# Patient Record
Sex: FEMALE | Race: WHITE | NOT HISPANIC OR LATINO | Employment: OTHER | URBAN - METROPOLITAN AREA
[De-identification: names, ages, dates, MRNs, and addresses within clinical notes are randomized per-mention and may not be internally consistent; named-entity substitution may affect disease eponyms.]

---

## 2019-05-09 RX ORDER — ALPRAZOLAM 0.5 MG/1
TABLET ORAL
Qty: 90 TABLET | Refills: 1 | Status: CANCELLED | OUTPATIENT
Start: 2019-05-09

## 2019-05-13 ENCOUNTER — TELEPHONE (OUTPATIENT)
Dept: INTERNAL MEDICINE | Facility: CLINIC | Age: 84
End: 2019-05-13

## 2019-05-13 RX ORDER — ALPRAZOLAM 0.5 MG/1
0.5 TABLET ORAL 3 TIMES DAILY PRN
Qty: 90 TABLET | Refills: 0 | Status: SHIPPED | OUTPATIENT
Start: 2019-05-13 | End: 2019-06-25 | Stop reason: SDUPTHER

## 2019-05-23 RX ORDER — CARVEDILOL 25 MG/1
25 TABLET ORAL 2 TIMES DAILY WITH MEALS
Qty: 180 TABLET | Refills: 0 | Status: SHIPPED | OUTPATIENT
Start: 2019-05-23 | End: 2019-08-18 | Stop reason: SDUPTHER

## 2019-06-25 RX ORDER — ALPRAZOLAM 0.5 MG/1
0.5 TABLET ORAL 3 TIMES DAILY PRN
Qty: 90 TABLET | Refills: 0 | Status: SHIPPED | OUTPATIENT
Start: 2019-06-25 | End: 2019-08-08 | Stop reason: SDUPTHER

## 2019-06-26 RX ORDER — POTASSIUM CHLORIDE 750 MG/1
10 TABLET, EXTENDED RELEASE ORAL 2 TIMES DAILY
Refills: 0 | COMMUNITY
Start: 2019-03-31 | End: 2019-06-26 | Stop reason: SDUPTHER

## 2019-06-26 RX ORDER — LISINOPRIL 5 MG/1
5 TABLET ORAL DAILY
Refills: 1 | COMMUNITY
Start: 2019-05-19 | End: 2019-11-06 | Stop reason: SDUPTHER

## 2019-06-26 RX ORDER — POTASSIUM CHLORIDE 750 MG/1
10 TABLET, EXTENDED RELEASE ORAL 2 TIMES DAILY
Qty: 180 TABLET | Refills: 0 | Status: SHIPPED | OUTPATIENT
Start: 2019-06-26 | End: 2019-09-25 | Stop reason: SDUPTHER

## 2019-08-07 RX ORDER — ALPRAZOLAM 0.5 MG/1
0.5 TABLET ORAL 3 TIMES DAILY PRN
Qty: 90 TABLET | Refills: 0 | OUTPATIENT
Start: 2019-08-07

## 2019-08-08 ENCOUNTER — TELEPHONE (OUTPATIENT)
Dept: INTERNAL MEDICINE | Facility: CLINIC | Age: 84
End: 2019-08-08

## 2019-08-08 RX ORDER — ALPRAZOLAM 0.5 MG/1
0.5 TABLET ORAL 3 TIMES DAILY PRN
Qty: 45 TABLET | Refills: 0 | Status: SHIPPED | OUTPATIENT
Start: 2019-08-08 | End: 2019-08-29 | Stop reason: SDUPTHER

## 2019-08-08 NOTE — TELEPHONE ENCOUNTER
Patient is calling for a refill on Xanax. Pharmacy advised it was denied because she needs an appointment. Is scheduled for 8/22/19. She has only one pill left. If she has to be seen first, she would like enough until appointment.    Thank you

## 2019-08-19 RX ORDER — CARVEDILOL 25 MG/1
TABLET ORAL
Qty: 180 TABLET | Refills: 0 | Status: SHIPPED | OUTPATIENT
Start: 2019-08-19 | End: 2019-11-25 | Stop reason: SDUPTHER

## 2019-08-20 RX ORDER — FUROSEMIDE 20 MG/1
TABLET ORAL
Qty: 90 TABLET | Refills: 0 | Status: ON HOLD | OUTPATIENT
Start: 2019-08-20 | End: 2019-09-17 | Stop reason: SDUPTHER

## 2019-08-22 ENCOUNTER — OFFICE VISIT (OUTPATIENT)
Dept: INTERNAL MEDICINE | Facility: CLINIC | Age: 84
End: 2019-08-22

## 2019-08-22 VITALS — BODY MASS INDEX: 20 KG/M2 | HEIGHT: 68 IN | WEIGHT: 132 LBS

## 2019-08-22 DIAGNOSIS — F40.00 AGORAPHOBIA: ICD-10-CM

## 2019-08-22 DIAGNOSIS — R09.02 HYPOXIA: ICD-10-CM

## 2019-08-22 DIAGNOSIS — I10 ESSENTIAL HYPERTENSION: Primary | ICD-10-CM

## 2019-08-22 DIAGNOSIS — R60.0 LOCALIZED EDEMA: ICD-10-CM

## 2019-08-22 DIAGNOSIS — I50.22 CHRONIC SYSTOLIC CONGESTIVE HEART FAILURE (HCC): ICD-10-CM

## 2019-08-22 PROCEDURE — 99214 OFFICE O/P EST MOD 30 MIN: CPT | Performed by: INTERNAL MEDICINE

## 2019-08-22 NOTE — PROGRESS NOTES
Assessment and Plan  Rissa was seen today for hypertension and anxiety.    Diagnoses and all orders for this visit:    Essential hypertension  -     CBC & Differential; Future  -     Comprehensive Metabolic Panel; Future    Agoraphobia  Comments:  stable- continue regular alprazolam use.    Chronic systolic congestive heart failure (CMS/HCC)  Comments:  no evidence of decompensation today.    Hypoxia  Comments:  will order portable O2 for her to be a little more free to move around.     Localized edema  Comments:  I think the edema in her feet is dependent- keep elevated when able.  Continue to monitor sodium in her diet.       F/U and Patient Instructions    Return in about 3 months (around 11/22/2019).  There are no Patient Instructions on file for this visit.    Subjective    Lucy Duane is a 85 y.o. female being seen in our office today for Hypertension and Anxiety     History of the Present Illness  HPI Here for her reg f/u- she is feeling more SOB over the last couple of months.  She has some edema in her feet.  She uses O2 at home since hospitalization in 2016.  Does not have portable oxygen tank.   Very little activity -   Does well with alprazolam over years- no changes made.       Patient History        Significant Past History  The following portions of the patient's history were reviewed and updated as appropriate:PMHroutine: Social history , Allergies, Current Medications, Active Problem List and Health Maintenance              Social History  She  reports that she quit smoking about 11 years ago. Her smoking use included cigarettes. She has a 90.00 pack-year smoking history. She has never used smokeless tobacco. She reports that she does not drink alcohol or use drugs.                         Review of Symptoms  Review of Systems   Constitution: Negative for weight loss.   HENT: Negative.    Eyes: Negative.    Cardiovascular: Negative.    Respiratory: Positive for shortness of breath. Negative for  cough and wheezing.    Musculoskeletal: Negative.    Gastrointestinal: Negative.    Genitourinary: Negative.    Psychiatric/Behavioral: Negative.      Objective  Vital Signs         BP Readings from Last 1 Encounters:   04/04/16 140/60     Wt Readings from Last 3 Encounters:   08/22/19 59.9 kg (132 lb)   04/04/16 60.8 kg (134 lb)   Body mass index is 20.07 kg/m².          Physical Exam   Physical Exam   Constitutional: She is oriented to person, place, and time. No distress.   Cardiovascular: Normal rate. An irregularly irregular rhythm present.   Abdominal: Soft.   Musculoskeletal: Edema: pedal edema only.   Neurological: She is alert and oriented to person, place, and time.   Psychiatric: She has a normal mood and affect. Her behavior is normal. Judgment and thought content normal.     Data Reviewed    No results found for this or any previous visit (from the past 2016 hour(s)).

## 2019-08-26 PROBLEM — I50.9 CHF (CONGESTIVE HEART FAILURE) (HCC): Status: ACTIVE | Noted: 2019-08-26

## 2019-08-29 RX ORDER — ALPRAZOLAM 0.5 MG/1
0.5 TABLET ORAL 3 TIMES DAILY PRN
Qty: 90 TABLET | Refills: 4 | Status: ON HOLD | OUTPATIENT
Start: 2019-08-29 | End: 2020-01-28 | Stop reason: SDUPTHER

## 2019-09-14 ENCOUNTER — APPOINTMENT (OUTPATIENT)
Dept: GENERAL RADIOLOGY | Facility: HOSPITAL | Age: 84
End: 2019-09-14

## 2019-09-14 ENCOUNTER — HOSPITAL ENCOUNTER (OUTPATIENT)
Facility: HOSPITAL | Age: 84
Setting detail: OBSERVATION
Discharge: HOME | End: 2019-09-17
Attending: EMERGENCY MEDICINE | Admitting: INTERNAL MEDICINE

## 2019-09-14 DIAGNOSIS — R53.1 GENERAL WEAKNESS: ICD-10-CM

## 2019-09-14 DIAGNOSIS — N18.9 CHRONIC KIDNEY DISEASE, UNSPECIFIED CKD STAGE: ICD-10-CM

## 2019-09-14 DIAGNOSIS — I50.33 ACUTE ON CHRONIC DIASTOLIC CHF (CONGESTIVE HEART FAILURE) (HCC): Primary | ICD-10-CM

## 2019-09-14 LAB
ALBUMIN SERPL-MCNC: 3.3 G/DL (ref 3.5–5.2)
ALBUMIN/GLOB SERPL: 0.9 G/DL
ALP SERPL-CCNC: 72 U/L (ref 39–117)
ALT SERPL W P-5'-P-CCNC: 6 U/L (ref 1–33)
ANION GAP SERPL CALCULATED.3IONS-SCNC: 10.1 MMOL/L (ref 5–15)
AST SERPL-CCNC: 14 U/L (ref 1–32)
BASOPHILS # BLD AUTO: 0.01 10*3/MM3 (ref 0–0.2)
BASOPHILS NFR BLD AUTO: 0.2 % (ref 0–1.5)
BILIRUB SERPL-MCNC: 0.5 MG/DL (ref 0.2–1.2)
BILIRUB UR QL STRIP: NEGATIVE
BUN BLD-MCNC: 25 MG/DL (ref 8–23)
BUN/CREAT SERPL: 21.6 (ref 7–25)
CALCIUM SPEC-SCNC: 8.7 MG/DL (ref 8.6–10.5)
CHLORIDE SERPL-SCNC: 93 MMOL/L (ref 98–107)
CLARITY UR: CLEAR
CO2 SERPL-SCNC: 33.9 MMOL/L (ref 22–29)
COLOR UR: YELLOW
CREAT BLD-MCNC: 1.16 MG/DL (ref 0.57–1)
DEPRECATED RDW RBC AUTO: 51.4 FL (ref 37–54)
EOSINOPHIL # BLD AUTO: 0.11 10*3/MM3 (ref 0–0.4)
EOSINOPHIL NFR BLD AUTO: 2.1 % (ref 0.3–6.2)
ERYTHROCYTE [DISTWIDTH] IN BLOOD BY AUTOMATED COUNT: 13.3 % (ref 12.3–15.4)
GFR SERPL CREATININE-BSD FRML MDRD: 44 ML/MIN/1.73
GLOBULIN UR ELPH-MCNC: 3.6 GM/DL
GLUCOSE BLD-MCNC: 101 MG/DL (ref 65–99)
GLUCOSE UR STRIP-MCNC: NEGATIVE MG/DL
HCT VFR BLD AUTO: 37.6 % (ref 34–46.6)
HGB BLD-MCNC: 11 G/DL (ref 12–15.9)
HGB UR QL STRIP.AUTO: NEGATIVE
KETONES UR QL STRIP: NEGATIVE
LEUKOCYTE ESTERASE UR QL STRIP.AUTO: NEGATIVE
LYMPHOCYTES # BLD AUTO: 0.58 10*3/MM3 (ref 0.7–3.1)
LYMPHOCYTES NFR BLD AUTO: 11.1 % (ref 19.6–45.3)
MCH RBC QN AUTO: 30.1 PG (ref 26.6–33)
MCHC RBC AUTO-ENTMCNC: 29.3 G/DL (ref 31.5–35.7)
MCV RBC AUTO: 102.7 FL (ref 79–97)
MONOCYTES # BLD AUTO: 0.3 10*3/MM3 (ref 0.1–0.9)
MONOCYTES NFR BLD AUTO: 5.7 % (ref 5–12)
NEUTROPHILS # BLD AUTO: 4.21 10*3/MM3 (ref 1.7–7)
NEUTROPHILS NFR BLD AUTO: 80.7 % (ref 42.7–76)
NITRITE UR QL STRIP: NEGATIVE
NT-PROBNP SERPL-MCNC: 1223 PG/ML (ref 5–1800)
PH UR STRIP.AUTO: 6.5 [PH] (ref 5–8)
PLATELET # BLD AUTO: 135 10*3/MM3 (ref 140–450)
PMV BLD AUTO: 10.8 FL (ref 6–12)
POTASSIUM BLD-SCNC: 4.5 MMOL/L (ref 3.5–5.2)
PROCALCITONIN SERPL-MCNC: 0.05 NG/ML (ref 0.1–0.25)
PROT SERPL-MCNC: 6.9 G/DL (ref 6–8.5)
PROT UR QL STRIP: NEGATIVE
RBC # BLD AUTO: 3.66 10*6/MM3 (ref 3.77–5.28)
SODIUM BLD-SCNC: 137 MMOL/L (ref 136–145)
SP GR UR STRIP: 1.01 (ref 1–1.03)
TROPONIN T SERPL-MCNC: <0.01 NG/ML (ref 0–0.03)
UROBILINOGEN UR QL STRIP: NORMAL
WBC NRBC COR # BLD: 5.22 10*3/MM3 (ref 3.4–10.8)

## 2019-09-14 PROCEDURE — 83880 ASSAY OF NATRIURETIC PEPTIDE: CPT | Performed by: EMERGENCY MEDICINE

## 2019-09-14 PROCEDURE — G0378 HOSPITAL OBSERVATION PER HR: HCPCS

## 2019-09-14 PROCEDURE — 93010 ELECTROCARDIOGRAM REPORT: CPT | Performed by: INTERNAL MEDICINE

## 2019-09-14 PROCEDURE — 84484 ASSAY OF TROPONIN QUANT: CPT | Performed by: EMERGENCY MEDICINE

## 2019-09-14 PROCEDURE — 94640 AIRWAY INHALATION TREATMENT: CPT

## 2019-09-14 PROCEDURE — 94799 UNLISTED PULMONARY SVC/PX: CPT

## 2019-09-14 PROCEDURE — 80053 COMPREHEN METABOLIC PANEL: CPT | Performed by: EMERGENCY MEDICINE

## 2019-09-14 PROCEDURE — 81003 URINALYSIS AUTO W/O SCOPE: CPT | Performed by: EMERGENCY MEDICINE

## 2019-09-14 PROCEDURE — 25010000002 FUROSEMIDE PER 20 MG: Performed by: EMERGENCY MEDICINE

## 2019-09-14 PROCEDURE — 25010000002 FUROSEMIDE PER 20 MG: Performed by: HOSPITALIST

## 2019-09-14 PROCEDURE — 93005 ELECTROCARDIOGRAM TRACING: CPT | Performed by: EMERGENCY MEDICINE

## 2019-09-14 PROCEDURE — 96376 TX/PRO/DX INJ SAME DRUG ADON: CPT

## 2019-09-14 PROCEDURE — 99285 EMERGENCY DEPT VISIT HI MDM: CPT

## 2019-09-14 PROCEDURE — 85025 COMPLETE CBC W/AUTO DIFF WBC: CPT | Performed by: EMERGENCY MEDICINE

## 2019-09-14 PROCEDURE — 25010000002 ENOXAPARIN PER 10 MG: Performed by: HOSPITALIST

## 2019-09-14 PROCEDURE — P9612 CATHETERIZE FOR URINE SPEC: HCPCS

## 2019-09-14 PROCEDURE — 71045 X-RAY EXAM CHEST 1 VIEW: CPT

## 2019-09-14 PROCEDURE — 84145 PROCALCITONIN (PCT): CPT | Performed by: EMERGENCY MEDICINE

## 2019-09-14 PROCEDURE — 96374 THER/PROPH/DIAG INJ IV PUSH: CPT

## 2019-09-14 RX ORDER — CARVEDILOL 25 MG/1
25 TABLET ORAL 2 TIMES DAILY WITH MEALS
Status: DISCONTINUED | OUTPATIENT
Start: 2019-09-14 | End: 2019-09-17 | Stop reason: HOSPADM

## 2019-09-14 RX ORDER — ACETAMINOPHEN 160 MG/5ML
650 SOLUTION ORAL EVERY 4 HOURS PRN
Status: DISCONTINUED | OUTPATIENT
Start: 2019-09-14 | End: 2019-09-17 | Stop reason: HOSPADM

## 2019-09-14 RX ORDER — ACETAMINOPHEN 650 MG/1
650 SUPPOSITORY RECTAL EVERY 4 HOURS PRN
Status: DISCONTINUED | OUTPATIENT
Start: 2019-09-14 | End: 2019-09-17 | Stop reason: HOSPADM

## 2019-09-14 RX ORDER — NITROGLYCERIN 0.4 MG/1
0.4 TABLET SUBLINGUAL
Status: DISCONTINUED | OUTPATIENT
Start: 2019-09-14 | End: 2019-09-17 | Stop reason: HOSPADM

## 2019-09-14 RX ORDER — FUROSEMIDE 10 MG/ML
40 INJECTION INTRAMUSCULAR; INTRAVENOUS ONCE
Status: COMPLETED | OUTPATIENT
Start: 2019-09-14 | End: 2019-09-14

## 2019-09-14 RX ORDER — ONDANSETRON 4 MG/1
4 TABLET, FILM COATED ORAL EVERY 6 HOURS PRN
Status: DISCONTINUED | OUTPATIENT
Start: 2019-09-14 | End: 2019-09-17 | Stop reason: HOSPADM

## 2019-09-14 RX ORDER — ALPRAZOLAM 0.5 MG/1
0.5 TABLET ORAL 3 TIMES DAILY PRN
Status: DISCONTINUED | OUTPATIENT
Start: 2019-09-14 | End: 2019-09-17 | Stop reason: HOSPADM

## 2019-09-14 RX ORDER — IPRATROPIUM BROMIDE AND ALBUTEROL SULFATE 2.5; .5 MG/3ML; MG/3ML
3 SOLUTION RESPIRATORY (INHALATION) ONCE
Status: COMPLETED | OUTPATIENT
Start: 2019-09-14 | End: 2019-09-14

## 2019-09-14 RX ORDER — POTASSIUM CHLORIDE 750 MG/1
20 CAPSULE, EXTENDED RELEASE ORAL 2 TIMES DAILY WITH MEALS
Status: DISCONTINUED | OUTPATIENT
Start: 2019-09-14 | End: 2019-09-17 | Stop reason: HOSPADM

## 2019-09-14 RX ORDER — ONDANSETRON 2 MG/ML
4 INJECTION INTRAMUSCULAR; INTRAVENOUS EVERY 6 HOURS PRN
Status: DISCONTINUED | OUTPATIENT
Start: 2019-09-14 | End: 2019-09-17 | Stop reason: HOSPADM

## 2019-09-14 RX ORDER — SODIUM CHLORIDE 9 MG/ML
125 INJECTION, SOLUTION INTRAVENOUS CONTINUOUS
Status: DISCONTINUED | OUTPATIENT
Start: 2019-09-14 | End: 2019-09-14

## 2019-09-14 RX ORDER — LISINOPRIL 5 MG/1
5 TABLET ORAL DAILY
Status: DISCONTINUED | OUTPATIENT
Start: 2019-09-14 | End: 2019-09-17 | Stop reason: HOSPADM

## 2019-09-14 RX ORDER — SODIUM CHLORIDE 0.9 % (FLUSH) 0.9 %
10 SYRINGE (ML) INJECTION AS NEEDED
Status: DISCONTINUED | OUTPATIENT
Start: 2019-09-14 | End: 2019-09-14

## 2019-09-14 RX ORDER — ACETAMINOPHEN 325 MG/1
650 TABLET ORAL EVERY 4 HOURS PRN
Status: DISCONTINUED | OUTPATIENT
Start: 2019-09-14 | End: 2019-09-17 | Stop reason: HOSPADM

## 2019-09-14 RX ADMIN — FUROSEMIDE 40 MG: 10 INJECTION, SOLUTION INTRAMUSCULAR; INTRAVENOUS at 15:07

## 2019-09-14 RX ADMIN — IPRATROPIUM BROMIDE AND ALBUTEROL SULFATE 3 ML: 2.5; .5 SOLUTION RESPIRATORY (INHALATION) at 13:11

## 2019-09-14 RX ADMIN — FUROSEMIDE 40 MG: 10 INJECTION, SOLUTION INTRAMUSCULAR; INTRAVENOUS at 22:02

## 2019-09-14 RX ADMIN — ALPRAZOLAM 0.5 MG: 0.5 TABLET ORAL at 22:02

## 2019-09-14 RX ADMIN — CARVEDILOL 25 MG: 25 TABLET, FILM COATED ORAL at 22:01

## 2019-09-14 RX ADMIN — SODIUM CHLORIDE 1000 ML: 9 INJECTION, SOLUTION INTRAVENOUS at 13:30

## 2019-09-14 RX ADMIN — POTASSIUM CHLORIDE 20 MEQ: 750 CAPSULE, EXTENDED RELEASE ORAL at 18:38

## 2019-09-14 NOTE — PLAN OF CARE
Problem: Fall Risk (Adult)  Goal: Identify Related Risk Factors and Signs and Symptoms  Outcome: Ongoing (interventions implemented as appropriate)    Goal: Absence of Fall  Outcome: Ongoing (interventions implemented as appropriate)      Problem: Skin Injury Risk (Adult)  Goal: Identify Related Risk Factors and Signs and Symptoms  Outcome: Ongoing (interventions implemented as appropriate)      Problem: Patient Care Overview  Goal: Plan of Care Review  Outcome: Ongoing (interventions implemented as appropriate)   09/14/19 1630 09/14/19 2832   Plan of Care Review   Progress --  no change   OTHER   Outcome Summary --  VSS. Pt admitted today after episode of bowel incontinence at home, w confusion. X ray showed L. pleural effusion. Will continue to monitor.   Coping/Psychosocial   Plan of Care Reviewed With patient;family --      Goal: Individualization and Mutuality  Outcome: Ongoing (interventions implemented as appropriate)    Goal: Discharge Needs Assessment  Outcome: Ongoing (interventions implemented as appropriate)    Goal: Interprofessional Rounds/Family Conf  Outcome: Ongoing (interventions implemented as appropriate)      Problem: Cardiac: Heart Failure (Adult)  Goal: Signs and Symptoms of Listed Potential Problems Will be Absent, Minimized or Managed (Cardiac: Heart Failure)  Outcome: Ongoing (interventions implemented as appropriate)

## 2019-09-14 NOTE — ED NOTES
Patient presents with generalized weakness, nasal and chest congestion, with productive cough with clear secretions.  Patient notes shortness of breath that is worse with exertion.  Notes swelling in bilateral lower extremities, no swelling noted at this time.  Patient notes intermittent diarrhea for the past two weeks, denies any nausea or vomiting.  Notes decrease in appetite, but is able to drink adequate amount of PO fluids at home.  Breathing is currently even and unlabored.  Family at bedside.  Will continue to monitor.      Artie Oh RN  09/14/19 [id number]

## 2019-09-14 NOTE — ED PROVIDER NOTES
EMERGENCY DEPARTMENT ENCOUNTER    Room Number:  12/12  Date of encounter:  9/14/2019  PCP: Migdalia Espitia MD  Historian: Patient and pt's son      HPI:  Chief Complaint: SOA  A complete HPI/ROS/PMH/PSH/SH/FH are unobtainable due to: nothing    Context: Lucy Duane is a 85 y.o. female with hx of Afib and CHF presents to the ED c/o SOA that started today. She admits to postnasal drip, diarrhea, and cough. She denies BLE swelling, black/bloody stool,  and vomiting. Pt notes that he is on O2 at home and it is getting harder to breathe without the O2. She reports hx of CHF. Pt's son states the pt does have allergies. Pt's so also notes that the pt has taken her morning medications.   She states she has felt weak for several weeks, and saw Dr. Espitia at the onset.  She had one episode of diarrhea this morning.      PAST MEDICAL HISTORY  Active Ambulatory Problems     Diagnosis Date Noted   • Edema 03/30/2016   • HTN (hypertension) 03/30/2016   • Agoraphobia 03/30/2016   • Orthopnea 03/30/2016   • Hyponatremia 03/30/2016   • Atrial fibrillation (CMS/HCC) 03/31/2016   • Cellulitis of foot 03/31/2016   • Proteinuria 03/31/2016   • Hypoxia 04/01/2016   • Pulmonary nodules 04/02/2016   • Pancreatic cyst 04/02/2016   • CHF (congestive heart failure) (CMS/HCC) 08/26/2019     Resolved Ambulatory Problems     Diagnosis Date Noted   • No Resolved Ambulatory Problems     Past Medical History:   Diagnosis Date   • Atrial fibrillation (CMS/HCC)    • CHF (congestive heart failure) (CMS/HCC)    • Compromised renal function          PAST SURGICAL HISTORY  Past Surgical History:   Procedure Laterality Date   • BREAST SURGERY      Lumpectomy and abscess removal   • EYE SURGERY           FAMILY HISTORY  Family History   Problem Relation Age of Onset   • Rheumatic fever Mother          SOCIAL HISTORY  Social History     Socioeconomic History   • Marital status: Unknown     Spouse name: Not on file   • Number of children: Not on file   •  Years of education: Not on file   • Highest education level: Not on file   Tobacco Use   • Smoking status: Former Smoker     Packs/day: 1.50     Years: 60.00     Pack years: 90.00     Types: Cigarettes     Last attempt to quit: 3/31/2008     Years since quittin.4   • Smokeless tobacco: Never Used   Substance and Sexual Activity   • Alcohol use: No   • Drug use: No     Comment: + CAFFEINE   Social History Narrative    - lives at home with spouse         ALLERGIES  Penicillins        REVIEW OF SYSTEMS  Review of Systems   Constitutional: Negative for fever.   HENT: Positive for postnasal drip. Negative for sore throat.    Eyes: Negative.    Respiratory: Positive for cough and shortness of breath.    Cardiovascular: Negative for chest pain and leg swelling.   Gastrointestinal: Positive for diarrhea. Negative for abdominal pain, blood in stool and vomiting.   Genitourinary: Negative for dysuria.   Musculoskeletal: Negative for neck pain.   Skin: Negative for rash.   Allergic/Immunologic: Negative.    Neurological: Negative for weakness, numbness and headaches.   Hematological: Negative.    Psychiatric/Behavioral: Negative.    All other systems reviewed and are negative.     All systems reviewed and negative except for those discussed in HPI.       PHYSICAL EXAM    I have reviewed the triage vital signs and nursing notes.    ED Triage Vitals   Temp Heart Rate Resp BP SpO2   19 1235 19 1234 19 1234 19 1234 19 1234   97.5 °F (36.4 °C) 88 16 170/70 96 %      Temp src Heart Rate Source Patient Position BP Location FiO2 (%)   19 1235 19 1234 -- -- --   Tympanic Monitor          Physical Exam   Constitutional: Pt. is oriented to person, place, and time and well-developed, well-nourished, and in no distress. No distress.   HENT:   Head: Normocephalic and atraumatic. Dry Mucous membranes  Neck: Normal range of motion. Neck supple. No JVD present. No tracheal deviation  present. No thyromegaly present.   Cardiovascular: Normal rate, regular rhythm and normal heart sounds. Exam reveals no gallop and no friction rub.   No murmur heard.  Pulmonary/Chest:Diminished breathe sounds bilaterally. No stridor. No respiratory distress. No wheezes.   Abdominal: Soft. Bowel sounds are normal. No distension. There is no tenderness. There is no rebound and no guarding.   Musculoskeletal: Normal range of motion. No edema, tenderness or deformity.   Neurological: Pt. is alert and oriented to person, place, and time. Pt. has normal sensation and generalized weakness - no focal deficits noted. No cranial nerve deficit. GCS score is 15.   Skin: Skin is warm and dry. No rash noted. Pt. is not diaphoretic. No erythema.   Psychiatric: Mood, affect and judgment normal.   Nursing note and vitals reviewed.      LAB RESULTS  Recent Results (from the past 24 hour(s))   Comprehensive Metabolic Panel    Collection Time: 09/14/19 12:55 PM   Result Value Ref Range    Glucose 101 (H) 65 - 99 mg/dL    BUN 25 (H) 8 - 23 mg/dL    Creatinine 1.16 (H) 0.57 - 1.00 mg/dL    Sodium 137 136 - 145 mmol/L    Potassium 4.5 3.5 - 5.2 mmol/L    Chloride 93 (L) 98 - 107 mmol/L    CO2 33.9 (H) 22.0 - 29.0 mmol/L    Calcium 8.7 8.6 - 10.5 mg/dL    Total Protein 6.9 6.0 - 8.5 g/dL    Albumin 3.30 (L) 3.50 - 5.20 g/dL    ALT (SGPT) 6 1 - 33 U/L    AST (SGOT) 14 1 - 32 U/L    Alkaline Phosphatase 72 39 - 117 U/L    Total Bilirubin 0.5 0.2 - 1.2 mg/dL    eGFR Non African Amer 44 (L) >60 mL/min/1.73    Globulin 3.6 gm/dL    A/G Ratio 0.9 g/dL    BUN/Creatinine Ratio 21.6 7.0 - 25.0    Anion Gap 10.1 5.0 - 15.0 mmol/L   BNP    Collection Time: 09/14/19 12:55 PM   Result Value Ref Range    proBNP 1,223.0 5.0-1,800.0 pg/mL   Troponin    Collection Time: 09/14/19 12:55 PM   Result Value Ref Range    Troponin T <0.010 0.000 - 0.030 ng/mL   Procalcitonin    Collection Time: 09/14/19 12:55 PM   Result Value Ref Range    Procalcitonin 0.05 (L)  0.10 - 0.25 ng/mL   CBC Auto Differential    Collection Time: 09/14/19 12:55 PM   Result Value Ref Range    WBC 5.22 3.40 - 10.80 10*3/mm3    RBC 3.66 (L) 3.77 - 5.28 10*6/mm3    Hemoglobin 11.0 (L) 12.0 - 15.9 g/dL    Hematocrit 37.6 34.0 - 46.6 %    .7 (H) 79.0 - 97.0 fL    MCH 30.1 26.6 - 33.0 pg    MCHC 29.3 (L) 31.5 - 35.7 g/dL    RDW 13.3 12.3 - 15.4 %    RDW-SD 51.4 37.0 - 54.0 fl    MPV 10.8 6.0 - 12.0 fL    Platelets 135 (L) 140 - 450 10*3/mm3    Neutrophil % 80.7 (H) 42.7 - 76.0 %    Lymphocyte % 11.1 (L) 19.6 - 45.3 %    Monocyte % 5.7 5.0 - 12.0 %    Eosinophil % 2.1 0.3 - 6.2 %    Basophil % 0.2 0.0 - 1.5 %    Neutrophils, Absolute 4.21 1.70 - 7.00 10*3/mm3    Lymphocytes, Absolute 0.58 (L) 0.70 - 3.10 10*3/mm3    Monocytes, Absolute 0.30 0.10 - 0.90 10*3/mm3    Eosinophils, Absolute 0.11 0.00 - 0.40 10*3/mm3    Basophils, Absolute 0.01 0.00 - 0.20 10*3/mm3   Urinalysis With Culture If Indicated - Urine, Catheter    Collection Time: 09/14/19  2:22 PM   Result Value Ref Range    Color, UA Yellow Yellow, Straw    Appearance, UA Clear Clear    pH, UA 6.5 5.0 - 8.0    Specific Gravity, UA 1.007 1.005 - 1.030    Glucose, UA Negative Negative    Ketones, UA Negative Negative    Bilirubin, UA Negative Negative    Blood, UA Negative Negative    Protein, UA Negative Negative    Leuk Esterase, UA Negative Negative    Nitrite, UA Negative Negative    Urobilinogen, UA 0.2 E.U./dL 0.2 - 1.0 E.U./dL       Ordered the above labs and independently reviewed the results.        RADIOLOGY  Xr Chest 1 View    Result Date: 9/14/2019  XR CHEST 1 VW-  HISTORY: Female who is 85 years-old,  short of breath  TECHNIQUE: Frontal view of the chest  COMPARISON: 04/01/2016  FINDINGS: The heart is enlarged. The aorta is calcified. Increased prominence of vascular and interstitial markings. No focal pulmonary consolidation. Minimal left pleural effusion. No pneumothorax. No acute osseous process.      Cardiomegaly with increased  prominence of vascular and interstitial markings, minimal left pleural effusion.  This report was finalized on 9/14/2019 2:47 PM by Dr. Zi Rogers M.D.        I ordered the above noted radiological studies. Reviewed by me and discussed with radiologist.  See dictation for official radiology interpretation.      PROCEDURES  Procedures      MEDICATIONS GIVEN IN ER  Medications   sodium chloride 0.9 % flush 10 mL (not administered)   sodium chloride 0.9 % infusion (not administered)   furosemide (LASIX) injection 40 mg (not administered)   ipratropium-albuterol (DUO-NEB) nebulizer solution 3 mL (3 mL Nebulization Given 9/14/19 1311)   sodium chloride 0.9 % bolus 1,000 mL (1,000 mL Intravenous New Bag 9/14/19 1330)         PROGRESS, DATA ANALYSIS, CONSULTS, AND MEDICAL DECISION MAKING    All labs have been independently reviewed by me.  All radiology studies have been reviewed by me and discussed with radiologist dictating the report.   EKG's independently viewed and interpreted by me.  Discussion below represents my analysis of pertinent findings related to patient's condition, differential diagnosis, treatment plan and final disposition.      ED Course as of Sep 14 1503   Sat Sep 14, 2019   1256 EKG performed at 12:54 PM and interpreted by me shows a normal sinus rhythm with a heart rate of 81.  The ME's, QRS complexes and ST segments are unremarkable.  The rhythm has changed from March 31, 2016 when she was in atrial fibrillation.  [WC]   1415 BUN: (!) 25 [WC]   1417 BUN & Cr increased from 3 years ago. Creatinine: (!) 1.16 [WC]   1501 Case discussed with Dr. Tex Kaye ([facility]) who agrees to admit the patient.  [WC]      ED Course User Index  [WC] Adriano Olsen MD           AS OF 3:03 PM VITALS:    BP - 153/55  HR - 64  TEMP - 97.5 °F (36.4 °C) (Tympanic)  02 SATS - 92%        DIAGNOSIS  Final diagnoses:   Acute on chronic diastolic CHF (congestive heart failure) (CMS/HCC)   Chronic kidney disease,  unspecified CKD stage   General weakness         DISPOSITION  ADMITTED              Documentation assistance provided by nicole Hameed for Dr. Olsen.  Information recorded by the scribe was done at my direction and has been verified and validated by me.     Francia Hameed  09/14/19 4534       Adriano Olsen MD  09/14/19 [id number]

## 2019-09-14 NOTE — PROGRESS NOTES
Pharmacy Note Re: Enoxaparin Dosing    Lucy Duane is a 85 y.o. female  CrCl ~ 33.8 mL/min (A) (by C-G formula based on SCr of 1.16 mg/dL (H)  Weighing 60.4 kg (133 lb 2.5 oz)    Consulted to dose adjust enoxaparin for VTE prophylaxis per Dr. Kaye    Lab Results   Component Value Date    CREATININE 1.16 (H) 09/14/2019    CREATININE 0.84 04/04/2016    CREATININE 0.87 04/01/2016     Lab Results   Component Value Date     (L) 09/14/2019     03/30/2016     Lab Results   Component Value Date    HGB 11.0 (L) 09/14/2019    HGB 14.1 03/30/2016     No results found for: INR    Will start enoxaparin 30 mg subq daily until creatinine improves. Cr 1.16, crcl borderline.   No documentation of output yet (patient just admitted)  Will adjust dose as needed    Jessica Covarrubias, PharmD, BCPS

## 2019-09-14 NOTE — H&P
Patient Name:  Lucy Duane  YOB: 1934  MRN:  [id number]  Admit Date:  9/14/2019  Patient Care Team:  Migdalia Espitia MD as PCP - General (Internal Medicine)      Subjective   History Present Illness     Chief Complaint   Patient presents with   • Weakness - Generalized   • Shortness of Breath   • Cough       Ms. Duane is a 85 y.o. former smoker with a history of afib (no AC), dCHF and CKD that presents to [facility] complaining of SOA and cough.  She states symptoms have been present for several months but her son says she is been more short of breath and usual for the last 10 days.  Slight cough but no fever or chills.  No fluctuation in weight or increased swelling in lower extremities.  She has been compliant with her diet medications.  No chest pain.  She does occasionally get palpitations where she feels her atrial fibrillation is uncontrolled.  She has previously seen Dr. Reeves and Dr. Hart but not recently.      History of Present Illness  Review of Systems   HENT: Negative.    Respiratory: Positive for cough and shortness of breath.    Cardiovascular: Positive for palpitations. Negative for leg swelling.   Gastrointestinal: Negative.    Endocrine: Negative.    Genitourinary: Negative.    Musculoskeletal: Negative.    Skin: Negative.    Neurological: Positive for weakness.   Hematological: Negative.    Psychiatric/Behavioral: Negative.         Personal History     Past Medical History:   Diagnosis Date   • Atrial fibrillation (CMS/HCC)    • CHF (congestive heart failure) (CMS/HCC)    • Compromised renal function      Past Surgical History:   Procedure Laterality Date   • BREAST SURGERY      Lumpectomy and abscess removal   • EYE SURGERY       Family History   Problem Relation Age of Onset   • Rheumatic fever Mother      Social History     Tobacco Use   • Smoking status: Former Smoker     Packs/day: 1.50     Years: 60.00     Pack years: 90.00     Types: Cigarettes      Last attempt to quit: 3/31/2008     Years since quittin.4   • Smokeless tobacco: Never Used   Substance Use Topics   • Alcohol use: No   • Drug use: No     Comment: + CAFFEINE     No current facility-administered medications on file prior to encounter.      Current Outpatient Medications on File Prior to Encounter   Medication Sig Dispense Refill   • ALPRAZolam (XANAX) 0.5 MG tablet Take 1 tablet by mouth 3 (Three) Times a Day As Needed for Anxiety. 90 tablet 4   • carvedilol (COREG) 25 MG tablet TAKE 1 TABLET BY MOUTH TWICE A DAY WITH MEALS 180 tablet 0   • furosemide (LASIX) 20 MG tablet TAKE 1 TABLET BY MOUTH EVERY DAY 90 tablet 0   • KLOR-CON 10 MEQ CR tablet Take 1 tablet by mouth 2 (Two) Times a Day. 180 tablet 0   • lisinopril (PRINIVIL,ZESTRIL) 5 MG tablet Take 5 mg by mouth Daily.  1     Allergies   Allergen Reactions   • Penicillins      Pt unsure of reaction       Objective    Objective     Vital Signs  Temp:  [97.5 °F (36.4 °C)-98.1 °F (36.7 °C)] 97.7 °F (36.5 °C)  Heart Rate:  [61-88] 78  Resp:  [16-18] 18  BP: (148-170)/(55-93) 152/89  SpO2:  [92 %-100 %] 97 %  on  Flow (L/min):  [2-3] 2;   Device (Oxygen Therapy): room air  Body mass index is 20.25 kg/m².    Physical Exam   Constitutional: She is oriented to person, place, and time. No distress.   Elderly, frail   HENT:   Head: Normocephalic and atraumatic.   Eyes: Conjunctivae and EOM are normal. No scleral icterus.   Neck: Normal range of motion. No JVD present.   Cardiovascular: Normal rate. An irregularly irregular rhythm present.   Pulmonary/Chest: Effort normal. She has decreased breath sounds (Throughout).   Abdominal: Soft. Bowel sounds are normal. She exhibits no distension. There is no tenderness.   Musculoskeletal: Normal range of motion. She exhibits no edema.   Neurological: She is alert and oriented to person, place, and time.   Skin: Skin is warm and dry.   Psychiatric: She has a normal mood and affect. Her behavior is normal.    Nursing note and vitals reviewed.      Results Review:  I reviewed the patient's new clinical results.  I reviewed the patient's new imaging results and agree with the interpretation.  I reviewed the patient's other test results and agree with the interpretation  I personally viewed and interpreted the patient's EKG/Telemetry data  Discussed with ED provider.    Lab Results (last 24 hours)     Procedure Component Value Units Date/Time    CBC & Differential [[id number]] Collected:  09/14/19 1255    Specimen:  Blood Updated:  09/14/19 8714    Narrative:       The following orders were created for panel order CBC & Differential.  Procedure                               Abnormality         Status                     ---------                               -----------         ------                     CBC Auto Differential[[id number]]        Abnormal            Final result                 Please view results for these tests on the individual orders.    Comprehensive Metabolic Panel [[id number]]  (Abnormal) Collected:  09/14/19 1255    Specimen:  Blood Updated:  09/14/19 1326     Glucose 101 mg/dL      BUN 25 mg/dL      Creatinine 1.16 mg/dL      Sodium 137 mmol/L      Potassium 4.5 mmol/L      Chloride 93 mmol/L      CO2 33.9 mmol/L      Calcium 8.7 mg/dL      Total Protein 6.9 g/dL      Albumin 3.30 g/dL      ALT (SGPT) 6 U/L      AST (SGOT) 14 U/L      Alkaline Phosphatase 72 U/L      Total Bilirubin 0.5 mg/dL      eGFR Non African Amer 44 mL/min/1.73      Globulin 3.6 gm/dL      A/G Ratio 0.9 g/dL      BUN/Creatinine Ratio 21.6     Anion Gap 10.1 mmol/L     Narrative:       GFR Normal >60  Chronic Kidney Disease <60  Kidney Failure <15    BNP [[id number]]  (Normal) Collected:  09/14/19 1255    Specimen:  Blood Updated:  09/14/19 1324     proBNP 1,223.0 pg/mL     Narrative:       Among patients with dyspnea, NT-proBNP is highly sensitive for the detection of acute congestive heart failure. In addition NT-proBNP of <300  "pg/ml effectively rules out acute congestive heart failure with 99% negative predictive value.    Troponin [[id number]]  (Normal) Collected:  09/14/19 1255    Specimen:  Blood Updated:  09/14/19 1326     Troponin T <0.010 ng/mL     Narrative:       Troponin T Reference Range:  <= 0.03 ng/mL-   Negative for AMI  >0.03 ng/mL-     Abnormal for myocardial necrosis.  Clinicians would have to utilize clinical acumen, EKG, Troponin and serial changes to determine if it is an Acute Myocardial Infarction or myocardial injury due to an underlying chronic condition.     Procalcitonin [[id number]]  (Abnormal) Collected:  09/14/19 1255    Specimen:  Blood Updated:  09/14/19 1331     Procalcitonin 0.05 ng/mL     Narrative:       As a Marker for Sepsis (Non-Neonates):   1. <0.5 ng/mL represents a low risk of severe sepsis and/or septic shock.  1. >2 ng/mL represents a high risk of severe sepsis and/or septic shock.    As a Marker for Lower Respiratory Tract Infections that require antibiotic therapy:  PCT on Admission     Antibiotic Therapy             6-12 Hrs later  > 0.5                Strongly Recommended            >0.25 - <0.5         Recommended  0.1 - 0.25           Discouraged                   Remeasure/reassess PCT  <0.1                 Strongly Discouraged          Remeasure/reassess PCT      As 28 day mortality risk marker: \"Change in Procalcitonin Result\" (> 80 % or <=80 %) if Day 0 (or Day 1) and Day 4 values are available. Refer to http://www.[facility]-pct-calculator.com/   Change in PCT <=80 %   A decrease of PCT levels below or equal to 80 % defines a positive change in PCT test result representing a higher risk for 28-day all-cause mortality of patients diagnosed with severe sepsis or septic shock.  Change in PCT > 80 %   A decrease of PCT levels of more than 80 % defines a negative change in PCT result representing a lower risk for 28-day all-cause mortality of patients diagnosed with severe sepsis or septic " shock.                CBC Auto Differential [[id number]]  (Abnormal) Collected:  09/14/19 1255    Specimen:  Blood Updated:  09/14/19 1404     WBC 5.22 10*3/mm3      RBC 3.66 10*6/mm3      Hemoglobin 11.0 g/dL      Hematocrit 37.6 %      .7 fL      MCH 30.1 pg      MCHC 29.3 g/dL      RDW 13.3 %      RDW-SD 51.4 fl      MPV 10.8 fL      Platelets 135 10*3/mm3      Neutrophil % 80.7 %      Lymphocyte % 11.1 %      Monocyte % 5.7 %      Eosinophil % 2.1 %      Basophil % 0.2 %      Neutrophils, Absolute 4.21 10*3/mm3      Lymphocytes, Absolute 0.58 10*3/mm3      Monocytes, Absolute 0.30 10*3/mm3      Eosinophils, Absolute 0.11 10*3/mm3      Basophils, Absolute 0.01 10*3/mm3     Urinalysis With Culture If Indicated - Urine, Catheter [[id number]]  (Normal) Collected:  09/14/19 1422    Specimen:  Urine, Catheter Updated:  09/14/19 1431     Color, UA Yellow     Appearance, UA Clear     pH, UA 6.5     Specific Gravity, UA 1.007     Glucose, UA Negative     Ketones, UA Negative     Bilirubin, UA Negative     Blood, UA Negative     Protein, UA Negative     Leuk Esterase, UA Negative     Nitrite, UA Negative     Urobilinogen, UA 0.2 E.U./dL    Narrative:       Urine microscopic not indicated.          Imaging Results (last 24 hours)     Procedure Component Value Units Date/Time    XR Chest 1 View [[id number]] Collected:  09/14/19 1446     Updated:  09/14/19 1451    Narrative:       XR CHEST 1 VW-     HISTORY: Female who is 85 years-old,  short of breath     TECHNIQUE: Frontal view of the chest     COMPARISON: 04/01/2016     FINDINGS: The heart is enlarged. The aorta is calcified. Increased  prominence of vascular and interstitial markings. No focal pulmonary  consolidation. Minimal left pleural effusion. No pneumothorax. No acute  osseous process.       Impression:       Cardiomegaly with increased prominence of vascular and  interstitial markings, minimal left pleural effusion.     This report was finalized on 9/14/2019  2:47 PM by Dr. Zi Rogers M.D.             Results for orders placed during the hospital encounter of 03/30/16   Adult transthoracic echo complete    Narrative · All left ventricular wall segments contract normally.  · Left ventricular function is normal. Estimated EF = 50%.        ECG 12 Lead   Final Result   HEART RATE= 81  bpm   RR Interval= 724  ms   VA Interval= 185  ms   P Horizontal Axis= -11  deg   P Front Axis= 77  deg   QRSD Interval= 94  ms   QT Interval= 374  ms   QRS Axis= -16  deg   T Wave Axis= 77  deg   - ABNORMAL ECG -   Sinus rhythm   Atrial premature complexes   Probable left ventricular hypertrophy   rate decreased and PVCs have resolved   Electronically Signed By: Jennifer Pruitt ([facility]) 14-Sep-2019 16:51:14   Date and Time of Study: 2019-09-14 12:54:02           Assessment/Plan     Active Hospital Problems    Diagnosis POA   • **Acute on chronic diastolic CHF (congestive heart failure) (CMS/HCC) [I50.33] Yes   • Pulmonary nodules [R91.8] Yes   • Atrial fibrillation (CMS/HCC) [I48.91] Yes   • HTN (hypertension) [I10] Yes       85 y.o. female admitted with Acute on chronic diastolic CHF (congestive heart failure) (CMS/HCC).    · Symptoms seem consistent with mildly decompensated diastolic CHF.  Will give another dose IV Lasix this evening and recheck BMP in a.m.  · Will obtain echocardiogram for comparison to one done 3 years prior.  · Will ask cardiology to evaluate.  · Lovenox 30 mg SC daily for DVT prophylaxis.  · Full code.  · Discussed with patient, family, nursing staff and ED provider.      Tex Kaye MD  [facility]  09/14/19  8:18 PM

## 2019-09-15 LAB
ANION GAP SERPL CALCULATED.3IONS-SCNC: 7.8 MMOL/L (ref 5–15)
BUN BLD-MCNC: 23 MG/DL (ref 8–23)
BUN/CREAT SERPL: 19.8 (ref 7–25)
CALCIUM SPEC-SCNC: 9.1 MG/DL (ref 8.6–10.5)
CHLORIDE SERPL-SCNC: 93 MMOL/L (ref 98–107)
CO2 SERPL-SCNC: 42.2 MMOL/L (ref 22–29)
CREAT BLD-MCNC: 1.16 MG/DL (ref 0.57–1)
DEPRECATED RDW RBC AUTO: 49.5 FL (ref 37–54)
ERYTHROCYTE [DISTWIDTH] IN BLOOD BY AUTOMATED COUNT: 13.2 % (ref 12.3–15.4)
GFR SERPL CREATININE-BSD FRML MDRD: 44 ML/MIN/1.73
GLUCOSE BLD-MCNC: 77 MG/DL (ref 65–99)
HCT VFR BLD AUTO: 37.5 % (ref 34–46.6)
HGB BLD-MCNC: 11.2 G/DL (ref 12–15.9)
MCH RBC QN AUTO: 30.1 PG (ref 26.6–33)
MCHC RBC AUTO-ENTMCNC: 29.9 G/DL (ref 31.5–35.7)
MCV RBC AUTO: 100.8 FL (ref 79–97)
PLATELET # BLD AUTO: 146 10*3/MM3 (ref 140–450)
PMV BLD AUTO: 10.1 FL (ref 6–12)
POTASSIUM BLD-SCNC: 4.4 MMOL/L (ref 3.5–5.2)
RBC # BLD AUTO: 3.72 10*6/MM3 (ref 3.77–5.28)
SODIUM BLD-SCNC: 143 MMOL/L (ref 136–145)
WBC NRBC COR # BLD: 4.99 10*3/MM3 (ref 3.4–10.8)

## 2019-09-15 PROCEDURE — G0378 HOSPITAL OBSERVATION PER HR: HCPCS

## 2019-09-15 PROCEDURE — 80048 BASIC METABOLIC PNL TOTAL CA: CPT | Performed by: HOSPITALIST

## 2019-09-15 PROCEDURE — 85027 COMPLETE CBC AUTOMATED: CPT | Performed by: HOSPITALIST

## 2019-09-15 RX ORDER — FUROSEMIDE 40 MG/1
40 TABLET ORAL DAILY
Status: DISCONTINUED | OUTPATIENT
Start: 2019-09-15 | End: 2019-09-17 | Stop reason: HOSPADM

## 2019-09-15 RX ADMIN — CARVEDILOL 25 MG: 25 TABLET, FILM COATED ORAL at 08:38

## 2019-09-15 RX ADMIN — POTASSIUM CHLORIDE 20 MEQ: 750 CAPSULE, EXTENDED RELEASE ORAL at 16:41

## 2019-09-15 RX ADMIN — ALPRAZOLAM 0.5 MG: 0.5 TABLET ORAL at 20:56

## 2019-09-15 RX ADMIN — FUROSEMIDE 40 MG: 40 TABLET ORAL at 16:40

## 2019-09-15 RX ADMIN — LISINOPRIL 5 MG: 5 TABLET ORAL at 08:38

## 2019-09-15 RX ADMIN — CARVEDILOL 25 MG: 25 TABLET, FILM COATED ORAL at 20:52

## 2019-09-15 RX ADMIN — POTASSIUM CHLORIDE 20 MEQ: 750 CAPSULE, EXTENDED RELEASE ORAL at 08:38

## 2019-09-15 NOTE — PLAN OF CARE
Problem: Fall Risk (Adult)  Goal: Identify Related Risk Factors and Signs and Symptoms  Outcome: Ongoing (interventions implemented as appropriate)    Goal: Absence of Fall  Outcome: Ongoing (interventions implemented as appropriate)      Problem: Skin Injury Risk (Adult)  Goal: Identify Related Risk Factors and Signs and Symptoms  Outcome: Outcome(s) achieved Date Met: 09/15/19    Goal: Skin Health and Integrity  Outcome: Ongoing (interventions implemented as appropriate)      Problem: Patient Care Overview  Goal: Individualization and Mutuality  Outcome: Ongoing (interventions implemented as appropriate)   09/15/19 0533   Individualization   Patient Specific Preferences Goes by Rissa     Goal: Discharge Needs Assessment  Outcome: Ongoing (interventions implemented as appropriate)    Goal: Interprofessional Rounds/Family Conf  Outcome: Ongoing (interventions implemented as appropriate)      Problem: Cardiac: Heart Failure (Adult)  Goal: Signs and Symptoms of Listed Potential Problems Will be Absent, Minimized or Managed (Cardiac: Heart Failure)  Outcome: Outcome(s) achieved Date Met: 09/15/19

## 2019-09-15 NOTE — CONSULTS
"Adult Nutrition  Assessment/PES    Patient Name:  Lucy Duane  YOB: 1934  MRN: [id number]  Admit Date:  9/14/2019    Assessment Date:  9/15/2019    Comments:  Nursing nutrition screen trigger. No significant weight loss. Intake 50%. Follow as needed.     Reason for Assessment     Row Name 09/15/19 1132          Reason for Assessment    Reason For Assessment  identified at risk by screening criteria     Diagnosis  -- CHF     Identified At Risk by Screening Criteria  MST SCORE 2+         Nutrition/Diet History     Row Name 09/15/19 1132          Nutrition/Diet History    Typical Food/Fluid Intake  intake 50% so far         Anthropometrics     Row Name 09/15/19 1133 09/15/19 0615       Anthropometrics    Height  172.7 cm (67.99\")  --    Weight  --  61.3 kg (135 lb 2.3 oz)       Ideal Body Weight (IBW)    Ideal Body Weight (IBW) (kg)  64.13  --        Labs/Tests/Procedures/Meds     Row Name 09/15/19 1133          Labs/Procedures/Meds    Lab Results Reviewed  reviewed, pertinent        Diagnostic Tests/Procedures    Diagnostic Test/Procedure Reviewed  reviewed, pertinent        Medications    Pertinent Medications Reviewed  reviewed, pertinent     Pertinent Medications Comments  coreg, lovenox, KCl         Physical Findings     Row Name 09/15/19 1133          Physical Findings    Skin  -- intact         Estimated/Assessed Needs     Row Name 09/15/19 1133          Calculation Measurements    Weight Used For Calculations  61.3 kg (135 lb 2.3 oz)     Height  172.7 cm (67.99\")         Nutrition Prescription Ordered     Row Name 09/15/19 1133          Nutrition Prescription PO    Current PO Diet  Regular     Common Modifiers  Cardiac         Evaluation of Received Nutrient/Fluid Intake     Row Name 09/15/19 1134          PO Evaluation    Number of Meals  1     % PO Intake  50%               Problem/Interventions:  Problem 1     Row Name 09/15/19 1134          Nutrition Diagnoses Problem 1    Problem 1  " Nutrition Appropriate for Condition at this Time     Etiology (related to)  Medical Diagnosis     Cardiac  CHF     Signs/Symptoms (evidenced by)  PO Intake     Percent (%) intake recorded  50 %     Over number of meals  2                 Intervention Goal     Row Name 09/15/19 1134          Intervention Goal    General  Maintain nutrition;Disease management/therapy     PO  Maintain intake             Education/Evaluation     Row Name 09/15/19 1134          Education    Education  Will Instruct as appropriate        Monitor/Evaluation    Monitor  Per protocol           Electronically signed by:  Araceli [email or web address] RD, LD  09/15/19 11:34 AM

## 2019-09-15 NOTE — PROGRESS NOTES
Name: Lucy Duane ADMIT: 2019   : 1934  PCP: Migdalia Espitia MD    MRN: [id number] LOS: 0 days   AGE/SEX: 85 y.o. female  ROOM: [facility]/     Subjective   Subjective   No new complaints.  Family thinks may be breathing better today.  She is oxygenating 99% on 2 L.  Breathing comfortably.  No new complaints today.     Objective   Objective   Vital Signs  Temp:  [97.3 °F (36.3 °C)-98.1 °F (36.7 °C)] 97.6 °F (36.4 °C)  Heart Rate:  [61-94] 88  Resp:  [16-18] 16  BP: (121-170)/(55-93) 121/61  SpO2:  [90 %-100 %] 92 %  on  Flow (L/min):  [2-3] 3;   Device (Oxygen Therapy): nasal cannula  Body mass index is 20.55 kg/m².    Intake/Output Summary (Last 24 hours) at 9/15/2019 1136  Last data filed at 9/15/2019 0848  Gross per 24 hour   Intake 1120 ml   Output 500 ml   Net 620 ml     Wt Readings from Last 1 Encounters:   09/15/19 0615 61.3 kg (135 lb 2.3 oz)   19 1632 60.4 kg (133 lb 2.5 oz)   19 1235 61 kg (134 lb 8 oz)     Wt Readings from Last 3 Encounters:   09/15/19 61.3 kg (135 lb 2.3 oz)   19 59.9 kg (132 lb)   16 60.8 kg (134 lb)       Physical Exam   Constitutional: She is oriented to person, place, and time. She is cooperative. No distress.   Elderly, frail   Neck: No JVD present. No tracheal deviation present.   Cardiovascular: Normal rate and regular rhythm.   No murmur heard.  Pulmonary/Chest: Effort normal. No respiratory distress. She has decreased breath sounds.   Abdominal: Soft. Normal appearance and bowel sounds are normal. She exhibits no distension. There is no tenderness.   Musculoskeletal: She exhibits no edema.   Neurological: She is alert and oriented to person, place, and time.   Skin: Skin is warm and dry.   Psychiatric: She has a normal mood and affect. Her behavior is normal.   Nursing note and vitals reviewed.      Results Review:       I reviewed the patient's new clinical results.  Results from last 7 days   Lab Units 09/15/19  0455 19  1255   WBC  10*3/mm3 4.99 5.22   HEMOGLOBIN g/dL 11.2* 11.0*   PLATELETS 10*3/mm3 146 135*     Results from last 7 days   Lab Units 09/15/19  0455 09/14/19  1255   SODIUM mmol/L 143 137   POTASSIUM mmol/L 4.4 4.5   CHLORIDE mmol/L 93* 93*   CO2 mmol/L 42.2* 33.9*   BUN mg/dL 23 25*   CREATININE mg/dL 1.16* 1.16*   GLUCOSE mg/dL 77 101*   Estimated Creatinine Clearance: 34.3 mL/min (A) (by C-G formula based on SCr of 1.16 mg/dL (H)).  Results from last 7 days   Lab Units 09/15/19  0455 09/14/19  1255   CALCIUM mg/dL 9.1 8.7   ALBUMIN g/dL  --  3.30*     Results from last 7 days   Lab Units 09/14/19  1255   TROPONIN T ng/mL <0.010   PROBNP pg/mL 1,223.0         Invalid input(s): LDLCALC    Results for orders placed during the hospital encounter of 03/30/16   Adult transthoracic echo complete    Narrative · All left ventricular wall segments contract normally.  · Left ventricular function is normal. Estimated EF = 50%.            carvedilol 25 mg Oral BID With Meals   enoxaparin 30 mg Subcutaneous Q24H   lisinopril 5 mg Oral Daily   potassium chloride 20 mEq Oral BID With Meals       Pharmacy to Dose enoxaparin (LOVENOX)      Diet Regular; Cardiac       Assessment/Plan     Active Hospital Problems    Diagnosis  POA   • **Acute on chronic diastolic CHF (congestive heart failure) (CMS/HCC) [I50.33]  Yes   • Pulmonary nodules [R91.8]  Yes   • Atrial fibrillation (CMS/HCC) [I48.91]  Yes   • HTN (hypertension) [I10]  Yes      Resolved Hospital Problems   No resolved problems to display.       85 y.o. female admitted with Acute on chronic diastolic CHF (congestive heart failure) (CMS/HCC).     · Symptoms seem consistent with mildly decompensated diastolic CHF.    · I do not think her UOP is accurate.  She seems compensated today.  Will restart oral diuretic.  · Physical therapy yet to evaluate.  · Echocardiogram and cardiology consult pending.  · Patient requests continue Lovenox.  Will order SCDs for DVT prophylaxis.  · Discussed with  patient, family and nurse.      Tex Kaye MD  [facility]  09/15/19  11:36 AM

## 2019-09-15 NOTE — PLAN OF CARE
Problem: Fall Risk (Adult)  Goal: Identify Related Risk Factors and Signs and Symptoms  Outcome: Outcome(s) achieved Date Met: 09/15/19    Goal: Absence of Fall  Outcome: Ongoing (interventions implemented as appropriate)      Problem: Skin Injury Risk (Adult)  Goal: Skin Health and Integrity  Outcome: Ongoing (interventions implemented as appropriate)      Problem: Patient Care Overview  Goal: Plan of Care Review  Outcome: Ongoing (interventions implemented as appropriate)   09/15/19 4180   Plan of Care Review   Progress improving   OTHER   Outcome Summary VSS. Wears 2L at home and currently on 2L. Lovenox switched to SCDs for patient. Lasix PO started. CV consulted and waiting on echo. Continue to monitor.    Coping/Psychosocial   Plan of Care Reviewed With patient     Goal: Individualization and Mutuality  Outcome: Ongoing (interventions implemented as appropriate)    Goal: Discharge Needs Assessment  Outcome: Ongoing (interventions implemented as appropriate)    Goal: Interprofessional Rounds/Family Conf  Outcome: Ongoing (interventions implemented as appropriate)      Problem: Cardiac: Heart Failure (Adult)  Goal: Signs and Symptoms of Listed Potential Problems Will be Absent, Minimized or Managed (Cardiac: Heart Failure)  Outcome: Ongoing (interventions implemented as appropriate)

## 2019-09-16 ENCOUNTER — APPOINTMENT (OUTPATIENT)
Dept: CARDIOLOGY | Facility: HOSPITAL | Age: 84
End: 2019-09-16

## 2019-09-16 LAB
ANION GAP SERPL CALCULATED.3IONS-SCNC: 9.7 MMOL/L (ref 5–15)
BH CV ECHO MEAS - ACS: 1.6 CM
BH CV ECHO MEAS - AO MAX PG (FULL): 4.7 MMHG
BH CV ECHO MEAS - AO MAX PG: 7.4 MMHG
BH CV ECHO MEAS - AO MEAN PG (FULL): 2 MMHG
BH CV ECHO MEAS - AO MEAN PG: 3 MMHG
BH CV ECHO MEAS - AO ROOT AREA (BSA CORRECTED): 1.5
BH CV ECHO MEAS - AO ROOT AREA: 4.9 CM^2
BH CV ECHO MEAS - AO ROOT DIAM: 2.5 CM
BH CV ECHO MEAS - AO V2 MAX: 136 CM/SEC
BH CV ECHO MEAS - AO V2 MEAN: 82.2 CM/SEC
BH CV ECHO MEAS - AO V2 VTI: 27 CM
BH CV ECHO MEAS - AVA(I,A): 1.7 CM^2
BH CV ECHO MEAS - AVA(I,D): 1.7 CM^2
BH CV ECHO MEAS - AVA(V,A): 1.5 CM^2
BH CV ECHO MEAS - AVA(V,D): 1.5 CM^2
BH CV ECHO MEAS - BSA(HAYCOCK): 1.6 M^2
BH CV ECHO MEAS - BSA: 1.7 M^2
BH CV ECHO MEAS - BZI_BMI: 19 KILOGRAMS/M^2
BH CV ECHO MEAS - BZI_METRIC_HEIGHT: 172.7 CM
BH CV ECHO MEAS - BZI_METRIC_WEIGHT: 56.7 KG
BH CV ECHO MEAS - EDV(CUBED): 117.6 ML
BH CV ECHO MEAS - EDV(MOD-SP2): 52 ML
BH CV ECHO MEAS - EDV(MOD-SP4): 64 ML
BH CV ECHO MEAS - EDV(TEICH): 112.8 ML
BH CV ECHO MEAS - EF(CUBED): 74.7 %
BH CV ECHO MEAS - EF(MOD-BP): 57 %
BH CV ECHO MEAS - EF(MOD-SP2): 59.6 %
BH CV ECHO MEAS - EF(MOD-SP4): 59.4 %
BH CV ECHO MEAS - EF(TEICH): 66.4 %
BH CV ECHO MEAS - ESV(CUBED): 29.8 ML
BH CV ECHO MEAS - ESV(MOD-SP2): 21 ML
BH CV ECHO MEAS - ESV(MOD-SP4): 26 ML
BH CV ECHO MEAS - ESV(TEICH): 37.9 ML
BH CV ECHO MEAS - FS: 36.7 %
BH CV ECHO MEAS - IVS/LVPW: 0.82
BH CV ECHO MEAS - IVSD: 0.9 CM
BH CV ECHO MEAS - LV DIASTOLIC VOL/BSA (35-75): 38.2 ML/M^2
BH CV ECHO MEAS - LV MASS(C)D: 176 GRAMS
BH CV ECHO MEAS - LV MASS(C)DI: 105.2 GRAMS/M^2
BH CV ECHO MEAS - LV MAX PG: 2.7 MMHG
BH CV ECHO MEAS - LV MEAN PG: 1 MMHG
BH CV ECHO MEAS - LV SYSTOLIC VOL/BSA (12-30): 15.5 ML/M^2
BH CV ECHO MEAS - LV V1 MAX: 82 CM/SEC
BH CV ECHO MEAS - LV V1 MEAN: 56.6 CM/SEC
BH CV ECHO MEAS - LV V1 VTI: 18.1 CM
BH CV ECHO MEAS - LVIDD: 4.9 CM
BH CV ECHO MEAS - LVIDS: 3.1 CM
BH CV ECHO MEAS - LVLD AP2: 6.5 CM
BH CV ECHO MEAS - LVLD AP4: 6.5 CM
BH CV ECHO MEAS - LVLS AP2: 5.1 CM
BH CV ECHO MEAS - LVLS AP4: 5.8 CM
BH CV ECHO MEAS - LVOT AREA (M): 2.5 CM^2
BH CV ECHO MEAS - LVOT AREA: 2.5 CM^2
BH CV ECHO MEAS - LVOT DIAM: 1.8 CM
BH CV ECHO MEAS - LVPWD: 1.1 CM
BH CV ECHO MEAS - MV DEC SLOPE: 461 CM/SEC^2
BH CV ECHO MEAS - MV DEC TIME: 100 SEC
BH CV ECHO MEAS - MV E MAX VEL: 111 CM/SEC
BH CV ECHO MEAS - MV MAX PG: 5.9 MMHG
BH CV ECHO MEAS - MV MEAN PG: 2 MMHG
BH CV ECHO MEAS - MV P1/2T MAX VEL: 119 CM/SEC
BH CV ECHO MEAS - MV P1/2T: 75.6 MSEC
BH CV ECHO MEAS - MV V2 MAX: 121 CM/SEC
BH CV ECHO MEAS - MV V2 MEAN: 58.6 CM/SEC
BH CV ECHO MEAS - MV V2 VTI: 21.8 CM
BH CV ECHO MEAS - MVA P1/2T LCG: 1.8 CM^2
BH CV ECHO MEAS - MVA(P1/2T): 2.9 CM^2
BH CV ECHO MEAS - MVA(VTI): 2.1 CM^2
BH CV ECHO MEAS - PA ACC TIME: 0.13 SEC
BH CV ECHO MEAS - PA MAX PG (FULL): 0.78 MMHG
BH CV ECHO MEAS - PA MAX PG: 4.1 MMHG
BH CV ECHO MEAS - PA PR(ACCEL): 21.9 MMHG
BH CV ECHO MEAS - PA V2 MAX: 101 CM/SEC
BH CV ECHO MEAS - PVA(V,A): 2.3 CM^2
BH CV ECHO MEAS - PVA(V,D): 2.3 CM^2
BH CV ECHO MEAS - QP/QS: 1.2
BH CV ECHO MEAS - RAP SYSTOLE: 3 MMHG
BH CV ECHO MEAS - RV MAX PG: 3.3 MMHG
BH CV ECHO MEAS - RV MEAN PG: 1 MMHG
BH CV ECHO MEAS - RV V1 MAX: 90.9 CM/SEC
BH CV ECHO MEAS - RV V1 MEAN: 53.1 CM/SEC
BH CV ECHO MEAS - RV V1 VTI: 20.9 CM
BH CV ECHO MEAS - RVOT AREA: 2.5 CM^2
BH CV ECHO MEAS - RVOT DIAM: 1.8 CM
BH CV ECHO MEAS - RVSP: 34 MMHG
BH CV ECHO MEAS - SI(AO): 79.2 ML/M^2
BH CV ECHO MEAS - SI(CUBED): 52.5 ML/M^2
BH CV ECHO MEAS - SI(LVOT): 27.5 ML/M^2
BH CV ECHO MEAS - SI(MOD-SP2): 18.5 ML/M^2
BH CV ECHO MEAS - SI(MOD-SP4): 22.7 ML/M^2
BH CV ECHO MEAS - SI(TEICH): 44.7 ML/M^2
BH CV ECHO MEAS - SV(AO): 132.5 ML
BH CV ECHO MEAS - SV(CUBED): 87.9 ML
BH CV ECHO MEAS - SV(LVOT): 46.1 ML
BH CV ECHO MEAS - SV(MOD-SP2): 31 ML
BH CV ECHO MEAS - SV(MOD-SP4): 38 ML
BH CV ECHO MEAS - SV(RVOT): 53.2 ML
BH CV ECHO MEAS - SV(TEICH): 74.9 ML
BH CV ECHO MEAS - TAPSE (>1.6): 1.9 CM2
BH CV ECHO MEAS - TR MAX VEL: 297 CM/SEC
BH CV VAS BP RIGHT ARM: NORMAL MMHG
BH CV XLRA - RV BASE: 2.8 CM
BUN BLD-MCNC: 29 MG/DL (ref 8–23)
BUN/CREAT SERPL: 24.4 (ref 7–25)
CALCIUM SPEC-SCNC: 9.2 MG/DL (ref 8.6–10.5)
CHLORIDE SERPL-SCNC: 88 MMOL/L (ref 98–107)
CO2 SERPL-SCNC: 38.3 MMOL/L (ref 22–29)
CREAT BLD-MCNC: 1.19 MG/DL (ref 0.57–1)
GFR SERPL CREATININE-BSD FRML MDRD: 43 ML/MIN/1.73
GLUCOSE BLD-MCNC: 75 MG/DL (ref 65–99)
LEFT ATRIUM VOLUME INDEX: 19 ML/M2
MAXIMAL PREDICTED HEART RATE: 135 BPM
POTASSIUM BLD-SCNC: 4.3 MMOL/L (ref 3.5–5.2)
SODIUM BLD-SCNC: 136 MMOL/L (ref 136–145)
STRESS TARGET HR: 115 BPM

## 2019-09-16 PROCEDURE — 97162 PT EVAL MOD COMPLEX 30 MIN: CPT

## 2019-09-16 PROCEDURE — 93306 TTE W/DOPPLER COMPLETE: CPT

## 2019-09-16 PROCEDURE — G0378 HOSPITAL OBSERVATION PER HR: HCPCS

## 2019-09-16 PROCEDURE — 93306 TTE W/DOPPLER COMPLETE: CPT | Performed by: INTERNAL MEDICINE

## 2019-09-16 PROCEDURE — 99214 OFFICE O/P EST MOD 30 MIN: CPT | Performed by: INTERNAL MEDICINE

## 2019-09-16 PROCEDURE — 80048 BASIC METABOLIC PNL TOTAL CA: CPT | Performed by: HOSPITALIST

## 2019-09-16 PROCEDURE — 97110 THERAPEUTIC EXERCISES: CPT

## 2019-09-16 RX ADMIN — LISINOPRIL 5 MG: 5 TABLET ORAL at 08:22

## 2019-09-16 RX ADMIN — POTASSIUM CHLORIDE 20 MEQ: 750 CAPSULE, EXTENDED RELEASE ORAL at 17:10

## 2019-09-16 RX ADMIN — CARVEDILOL 25 MG: 25 TABLET, FILM COATED ORAL at 20:34

## 2019-09-16 RX ADMIN — ALPRAZOLAM 0.5 MG: 0.5 TABLET ORAL at 13:37

## 2019-09-16 RX ADMIN — FUROSEMIDE 40 MG: 40 TABLET ORAL at 08:22

## 2019-09-16 RX ADMIN — POTASSIUM CHLORIDE 20 MEQ: 750 CAPSULE, EXTENDED RELEASE ORAL at 08:22

## 2019-09-16 RX ADMIN — CARVEDILOL 25 MG: 25 TABLET, FILM COATED ORAL at 08:22

## 2019-09-16 NOTE — PLAN OF CARE
Problem: Fall Risk (Adult)  Goal: Absence of Fall  Outcome: Ongoing (interventions implemented as appropriate)      Problem: Skin Injury Risk (Adult)  Goal: Skin Health and Integrity  Outcome: Ongoing (interventions implemented as appropriate)      Problem: Patient Care Overview  Goal: Plan of Care Review   09/16/19 0501   Plan of Care Review   Progress improving   OTHER   Outcome Summary Pt denies pain, VSS, continues on 2L nasal cannula. Will continue to monitor   Coping/Psychosocial   Plan of Care Reviewed With patient       Problem: Cardiac: Heart Failure (Adult)  Goal: Signs and Symptoms of Listed Potential Problems Will be Absent, Minimized or Managed (Cardiac: Heart Failure)  Outcome: Ongoing (interventions implemented as appropriate)

## 2019-09-16 NOTE — THERAPY EVALUATION
Patient Name: Lucy Duane  : 1934    MRN: [id number]                              Today's Date: 2019       Admit Date: 2019    Visit Dx:     ICD-10-CM ICD-9-CM   1. Acute on chronic diastolic CHF (congestive heart failure) (CMS/HCC) I50.33 428.33     428.0   2. Chronic kidney disease, unspecified CKD stage N18.9 585.9   3. General weakness R53.1 780.79     Patient Active Problem List   Diagnosis   • Edema   • HTN (hypertension)   • Agoraphobia   • Orthopnea   • Hyponatremia   • Atrial fibrillation (CMS/HCC)   • Cellulitis of foot   • Proteinuria   • Hypoxia   • Pulmonary nodules   • Pancreatic cyst   • CHF (congestive heart failure) (CMS/HCC)   • Acute on chronic diastolic CHF (congestive heart failure) (CMS/HCC)     Past Medical History:   Diagnosis Date   • Atrial fibrillation (CMS/HCC)    • CHF (congestive heart failure) (CMS/HCC)    • Compromised renal function    • Oxygen dependent     2L at all times     Past Surgical History:   Procedure Laterality Date   • BREAST SURGERY      Lumpectomy and abscess removal   • EYE SURGERY       General Information     Row Name 19 1215          PT Evaluation Time/Intention    Document Type  evaluation  -LB     Mode of Treatment  physical therapy  -LB     Row Name 19 1215          General Information    Patient Profile Reviewed?  yes  -LB     Prior Level of Function  independent:;all household mobility  -LB     Existing Precautions/Restrictions  fall;oxygen therapy device and L/min  -LB     Barriers to Rehab  medically complex  -LB     Row Name 19 1215          Relationship/Environment    Lives With  alone  -LB     Row Name 19 1215          Resource/Environmental Concerns    Current Living Arrangements  home/apartment/condo  -LB     Row Name 19 1215          Home Main Entrance    Number of Stairs, Main Entrance  six  -LB     Stair Railings, Main Entrance  railings safe and in good condition  -LB     Row Name 19 1215           Stairs Within Home, Primary    Stairs, Within Home, Primary  flight of steps to basement - spiral   -LB     Row Name 09/16/19 1215          Cognitive Assessment/Intervention- PT/OT    Orientation Status (Cognition)  oriented x 4  -LB     Row Name 09/16/19 1215          Safety Issues, Functional Mobility    Impairments Affecting Function (Mobility)  endurance/activity tolerance  -LB       User Key  (r) = Recorded By, (t) = Taken By, (c) = Cosigned By    Initials Name Provider Type    Alanna Courtney PT Physical Therapist        Mobility     Row Name 09/16/19 1216          Bed Mobility Assessment/Treatment    Comment (Bed Mobility)  pt sleeps in recliner  -LB     Row Name 09/16/19 1216          Sit-Stand Transfer    Sit-Stand [place] (Transfers)  contact guard  -LB     Row Name 09/16/19 1216          Gait/Stairs Assessment/Training    [place] Level (Gait)  contact guard  -LB     Assistive Device (Gait)  -- HHA  -LB     Distance in Feet (Gait)  40 x 2; with rest break due to SOA.  -LB     Pattern (Gait)  step-to  -LB     Deviations/Abnormal Patterns (Gait)  base of support, narrow;maria isabel decreased  -LB       User Key  (r) = Recorded By, (t) = Taken By, (c) = Cosigned By    Initials Name Provider Type    Alanna Courtney PT Physical Therapist        Obj/Interventions     Row Name 09/16/19 1218          General ROM    GENERAL ROM COMMENTS  AYDE's WFL  -LB     Row Name 09/16/19 1218          MMT (Manual Muscle Testing)    General MMT Comments  AYDE's WFL  -LB     Row Name 09/16/19 1218          Static Sitting Balance    Level of [place] (Unsupported Sitting, Static Balance)  standby assist  -LB     Row Name 09/16/19 1218          Static Standing Balance    Level of [place] (Supported Standing, Static Balance)  contact guard assist  -LB     Row Name 09/16/19 1218          Dynamic Standing Balance    Level of [place], Reaches Outside Midline (Standing, Dynamic Balance)  contact guard assist  -LB      [facility] Name 09/16/19 1218          Sensory Assessment/Intervention    Sensory General Assessment  no sensation deficits identified  -LB       User Key  (r) = Recorded By, (t) = Taken By, (c) = Cosigned By    Initials Name Provider Type    Alanna Courtney, PT Physical Therapist        Goals/Plan     [facility] Name 09/16/19 1221          Transfer Goal 1 (PT)    Activity/Assistive Device (Transfer Goal 1, PT)  sit-to-stand/stand-to-sit  -LB     [place] Level/Cues Needed (Transfer Goal 1, PT)  conditional independence  -LB     Time Frame (Transfer Goal 1, PT)  long term goal (LTG);1 week  -LB     [facility] Name 09/16/19 1221          Gait Training Goal 1 (PT)    Activity/Assistive Device (Gait Training Goal 1, PT)  gait (walking locomotion)  -LB     [place] Level (Gait Training Goal 1, PT)  supervision required;independent  -LB     Distance (Gait Goal 1, PT)  100  -LB     Time Frame (Gait Training Goal 1, PT)  1 week;long term goal (LTG)  -LB       User Key  (r) = Recorded By, (t) = Taken By, (c) = Cosigned By    Initials Name Provider Type    Alanna Courtney, PT Physical Therapist        Clinical Impression     [facility] Name 09/16/19 1219          Pain Assessment    Additional Documentation  Pain Scale: Numbers Pre/Post-Treatment (Group)  -LB     Row Name 09/16/19 1219          Pain Scale: Numbers Pre/Post-Treatment    Pain Scale: Numbers, Pretreatment  2/10  -LB     Pain Scale: Numbers, Post-Treatment  3/10  -LB     Row Name 09/16/19 1219          Plan of Care Review    Plan of Care Reviewed With  patient  -LB     Row Name 09/16/19 1219          Physical Therapy Clinical Impression    Patient/Family Goals Statement (PT Clinical Impression)  To go home  -LB     Criteria for Skilled Interventions Met (PT Clinical Impression)  yes;treatment indicated  -LB     Rehab Potential (PT Clinical Summary)  good, to achieve stated therapy goals  -LB     [facility] Name 09/16/19 1219          Vital Signs    Pretreatment Heart Rate (beats/min)  62   -LB     Intratreatment Heart Rate (beats/min)  123  -LB     Posttreatment Heart Rate (beats/min)  92  -LB     Pre SpO2 (%)  96  -LB     O2 Delivery Pre Treatment  supplemental O2  -LB     Intra SpO2 (%)  89  -LB     O2 Delivery Intra Treatment  supplemental O2  -LB     Post SpO2 (%)  92  -LB     O2 Delivery Post Treatment  supplemental O2  -LB     Pre Patient Position  Sitting  -LB     Intra Patient Position  Standing  -LB     Post Patient Position  Sitting  -LB     Rest Breaks   1  -LB     Row Name 09/16/19 1219          Positioning and Restraints    Pre-Treatment Position  sitting in chair/recliner  -LB     Post Treatment Position  chair  -LB     In Chair  exit alarm on;call light within reach;reclined  -LB       User Key  (r) = Recorded By, (t) = Taken By, (c) = Cosigned By    Initials Name Provider Type    Alanna Courtney PT Physical Therapist        Outcome Measures     Row Name 09/16/19 1227          How much help from another person do you currently need...    Turning from your back to your side while in flat bed without using bedrails?  4  -LB     Moving from lying on back to sitting on the side of a flat bed without bedrails?  3  -LB     Moving to and from a bed to a chair (including a wheelchair)?  3  -LB     Standing up from a chair using your arms (e.g., wheelchair, bedside chair)?  3  -LB     Climbing 3-5 steps with a railing?  3  -LB     To walk in hospital room?  3  -LB     AM-PAC 6 Clicks Score (PT)  19  -LB     Row Name 09/16/19 1227          Functional Assessment    Outcome Measure Options  AM-PAC 6 Clicks Basic Mobility (PT)  -LB       User Key  (r) = Recorded By, (t) = Taken By, (c) = Cosigned By    Initials Name Provider Type    Alanna Courtney PT Physical Therapist        Physical Therapy Education     Title: PT OT SLP Therapies (In Progress)     Topic: Physical Therapy (In Progress)     Point: Mobility training (Done)     Learning Progress Summary           Patient Acceptance, E,TB, VU,NR  by DANIEL at 9/16/2019 12:22 PM                   Point: Precautions (Done)     Learning Progress Summary           Patient Acceptance, E,TB, VU,NR by  at 9/16/2019 12:22 PM                               User Key     Initials Effective Dates Name Provider Type Discipline     04/03/18 -  Alanna Harris PT Physical Therapist PT              PT Recommendation and Plan  Planned Therapy Interventions (PT Eval): bed mobility training, gait training, strengthening, transfer training(endurance)  Outcome Summary/Treatment Plan (PT)  Anticipated Discharge Disposition (PT): home with home health, skilled nursing facility  Plan of Care Reviewed With: patient  Outcome Summary: Pt is 84 y/o female admitted with SOA, cough, and swelling in LE's.  Pt found to have acute on chronic CHF.  Pt has a history of A-fib and is oxygen dependent.  Pt currenty has limited endurance with only short ambulation distance tolerated due to SOA and increased HR noted.  Pt voiced concerns about returning home alone but states she doesn't want to go to a nursing home.  Rec continued PT to work on strengthening and endurance     Time Calculation:   PT Charges     Row Name 09/16/19 1227             Time Calculation    Start Time  1142  -LB      Stop Time  1212  -LB      Time Calculation (min)  30 min  -LB      PT Received On  09/16/19  -LB      PT - Next Appointment  09/17/19  -LB      PT Goal Re-Cert Due Date  09/23/19  -LB        User Key  (r) = Recorded By, (t) = Taken By, (c) = Cosigned By    Initials Name Provider Type    Alanna Courtney PT Physical Therapist        Therapy Charges for Today     Code Description Service Date Service Provider Modifiers Qty    43594489522 HC PT EVAL MOD COMPLEXITY 2 9/16/2019 Alanna Harris, PT GP 1    93232111903 HC PT THER PROC EA 15 MIN 9/16/2019 Alanna Harris, PT GP 1          PT G-Codes  Outcome Measure Options: AM-PAC 6 Clicks Basic Mobility (PT)  AM-PAC 6 Clicks Score (PT): 19    Alanna Harris  PT  9/16/2019

## 2019-09-16 NOTE — PROGRESS NOTES
Met with re: HF management  HPI:  85 you WF admitted 2 days ago with complaints of increasing LE edema and ENGLE over the past 2 weeks or so.  Uses home O2; has oximeter and noted her sats dropped quickly if she removed her oxygen.  PMH:  HFpEF (50%), COPD, chronic respiratory failure, CKD, A fib (no anticoagulation), HTN  Hospital admits in past 12 months: 1  Social support:  Lives alone in 2 story house.  ; has son who helps with transportation to medical appointments, grocery shopping, etc.  Mobility:  Has a cane at home but rarely uses it.  No recent fall history.  Her bedroom is on the second floor, usually sleeps in her recliner.  Self care:  Rarely cooks.  Uses [employer]Whe[employer] currently by thinks she would be better off eating frozen dinners.  Admits she has not been as adherent with daily weight monitoring as she should be.  Also indicates she could be better with watching her dietary sodium intake.  She manages her own medications; denies forgetting or skipping doses.  No issues with payment for meds.  She sees her PCP regularly, every 3-6 months.  Exam:  No JVD; lungs diminished breath sounds bilat, no rales or wheezes; cardiac irreg rhythm, no murmur; ext no edema.  Summary:  She seems to have a good understanding of her medical condition, and has been pretty successful in managing her care (last hospitalization 2016).  She readily admits she probably has not been as careful as she should with self-care measures such as daily weight monitoring and sodium moderation.  She is feeling better and states she has had significant response to diuretics.  Need to increase activity and she is eager to do so.  Discussed with RN.  Cardiology has been consulted and repeat echo is  pending.

## 2019-09-16 NOTE — PLAN OF CARE
Problem: Patient Care Overview  Goal: Plan of Care Review  Outcome: Ongoing (interventions implemented as appropriate)   09/16/19 1222   OTHER   Outcome Summary Pt is 84 y/o female admitted with SOA, cough, and swelling in LE's. Pt found to have acute on chronic CHF. Pt has a history of A-fib and is oxygen dependent. Pt currenty has limited endurance with only short ambulation distance tolerated due to SOA and increased HR noted. Pt voiced concerns about returning home alone but states she doesn't want to go to a nursing home. Rec continued PT to work on strengthening and endurance   Coping/Psychosocial   Plan of Care Reviewed With patient

## 2019-09-16 NOTE — PROGRESS NOTES
Device Therapy Screening    Based on device therapy screening, this patient does not meet criteria for consideration of CRT and/or ICD at this time.    Janie Galeano, APRN  9/16/2019

## 2019-09-16 NOTE — PLAN OF CARE
Problem: Fall Risk (Adult)  Goal: Absence of Fall  Outcome: Ongoing (interventions implemented as appropriate)   09/16/19 0908   Fall Risk (Adult)   Absence of Fall making progress toward outcome       Problem: Skin Injury Risk (Adult)  Goal: Skin Health and Integrity  Outcome: Ongoing (interventions implemented as appropriate)   09/16/19 0908   Skin Injury Risk (Adult)   Skin Health and Integrity making progress toward outcome       Problem: Patient Care Overview  Goal: Plan of Care Review  Outcome: Ongoing (interventions implemented as appropriate)   09/16/19 0908   Plan of Care Review   Progress improving   OTHER   Outcome Summary VSS, pt stable. Continues on home 2 L O2. Cardio has seen this am. Echo today. Plan for chest x-ray early tomorrow am. Xanax given PRN this afternoon. Pt worked with PT and was up multiple times today to chair and walking to bathroom. WCM patient.    Coping/Psychosocial   Plan of Care Reviewed With patient     Goal: Discharge Needs Assessment  Outcome: Ongoing (interventions implemented as appropriate)   09/16/19 0908   Discharge Needs Assessment   Concerns to be Addressed no discharge needs identified;denies needs/concerns at this time       Problem: Cardiac: Heart Failure (Adult)  Goal: Signs and Symptoms of Listed Potential Problems Will be Absent, Minimized or Managed (Cardiac: Heart Failure)  Outcome: Ongoing (interventions implemented as appropriate)   09/16/19 0908   Goal/Outcome Evaluation   Problems Assessed (Heart Failure) all   Problems Present (Heart Failure) situational response;respiratory compromise

## 2019-09-16 NOTE — PROGRESS NOTES
Name: Lucy Duane ADMIT: 2019   : 1934  PCP: Migdalia Espitia MD    MRN: [id number] LOS: 0 days   AGE/SEX: 85 y.o. female  ROOM: [facility]   Subjective   Chief Complaint   Patient presents with   • Weakness - Generalized   • Shortness of Breath   • Cough   dyspnea is better  On diuretics    ROS  No f/c  No n/v  h    Objective   Vital Signs  Temp:  [97.7 °F (36.5 °C)-98.4 °F (36.9 °C)] 97.7 °F (36.5 °C)  Heart Rate:  [66-90] 88  Resp:  [18] 18  BP: (120-135)/(52-78) 135/68  SpO2:  [97 %] 97 %  on  Flow (L/min):  [2] 2;   Device (Oxygen Therapy): nasal cannula  Body mass index is 19.03 kg/m².    Physical Exam   Constitutional: She is oriented to person, place, and time. She appears well-developed and well-nourished.   HENT:   Head: Normocephalic and atraumatic.   Eyes: Conjunctivae are normal. No scleral icterus.   Neck: Neck supple. No tracheal deviation present.   Cardiovascular: Normal rate.   Pulmonary/Chest: Effort normal and breath sounds normal.   Abdominal: Soft. There is no tenderness. There is no guarding.   Neurological: She is alert and oriented to person, place, and time. She exhibits normal muscle tone.   Skin: Skin is warm and dry.   Psychiatric: She has a normal mood and affect. Her behavior is normal.       Results Review:       I reviewed the patient's new clinical results.  Results from last 7 days   Lab Units 09/15/19  0455 19  1255   WBC 10*3/mm3 4.99 5.22   HEMOGLOBIN g/dL 11.2* 11.0*   PLATELETS 10*3/mm3 146 135*     Results from last 7 days   Lab Units 19  0440 09/15/19  0455 19  1255   SODIUM mmol/L 136 143 137   POTASSIUM mmol/L 4.3 4.4 4.5   CHLORIDE mmol/L 88* 93* 93*   CO2 mmol/L 38.3* 42.2* 33.9*   BUN mg/dL 29* 23 25*   CREATININE mg/dL 1.19* 1.16* 1.16*   GLUCOSE mg/dL 75 77 101*   Estimated Creatinine Clearance: 30.9 mL/min (A) (by C-G formula based on SCr of 1.19 mg/dL (H)).  Results from last 7 days   Lab Units 19  1255   ALBUMIN g/dL 3.30*    BILIRUBIN mg/dL 0.5   ALK PHOS U/L 72   AST (SGOT) U/L 14   ALT (SGPT) U/L 6     Results from last 7 days   Lab Units 09/16/19  0440 09/15/19  0455 09/14/19  1255   CALCIUM mg/dL 9.2 9.1 8.7   ALBUMIN g/dL  --   --  3.30*     Results from last 7 days   Lab Units 09/14/19  1255   PROCALCITONIN ng/mL 0.05*       Coag     HbA1C No results found for: HGBA1C  Infection Results from last 7 days   Lab Units 09/14/19  1255   PROCALCITONIN ng/mL 0.05*     Radiology(recent) No radiology results for the last day  Lab Results   Component Value Date    TROPONINT <0.010 09/14/2019     No components found for: TSH;2      carvedilol 25 mg Oral BID With Meals   furosemide 40 mg Oral Daily   lisinopril 5 mg Oral Daily   potassium chloride 20 mEq Oral BID With Meals      Diet Regular; Cardiac      Assessment/Plan      Active Hospital Problems    Diagnosis  POA   • **Acute on chronic diastolic CHF (congestive heart failure) (CMS/HCC) [I50.33]  Yes   • Pulmonary nodules [R91.8]  Yes   • Atrial fibrillation (CMS/HCC) [I48.91]  Yes   • HTN (hypertension) [I10]  Yes      Resolved Hospital Problems   No resolved problems to display.       85 y.o. female admitted with Acute on chronic diastolic CHF (congestive heart failure) (CMS/HCC). Hisotry of MAT vs P.Afib. Has declined a/c.      · Symptoms seem consistent with mildly decompensated diastolic CHF.    · On diuretics, monitor renal fx  · Physical therapy  · Echocardiogram pending.  · She is on chronic oxygen  · SCDs for DVT prophylaxis.      Perhaps discharge tomorrow if doing well      Riki Munoz MD  [facility]  09/16/19  3:48 PM

## 2019-09-16 NOTE — CONSULTS
Patient Name: Lucy Duane  :1934  85 y.o.    Date of Admission: 2019  Date of Consultation:  19  Encounter Provider: Josefa Valiente RN  Place of Service: [facility] CARDIOLOGY  Referring Provider: Tex Kaye MD  Patient Care Team:  Migdalia Espitia MD as PCP - General (Internal Medicine)      Chief complaint: Acute on chronic congestive heart failure    Reason for Consult:  CHF exacerbation and assistance with management and work-up of heart failure    History of Present Illness:    Ms Kwok is an 85 year old patient with a history of reported atrial fibrillation and chronic diastolic CHF was admitted on 2019 from the emergency room with SOA, cough, and diarrhea.     Leading up to her emergency room visit the patient had been experiencing increasing shortness of breath as well as leg swelling over the last couple of weeks.  She was seen by her primary care physician in late August where she did note increased leg swelling which was felt to be more dependent edema rather than heart failure exacerbation.  Supplemental oxygen was ordered for the patient but unfortunately her symptoms continue to worsen throughout the following weeks.  Her shortness of breath became more severe and associated with nasal congestion and cough.  Given the lack of improvement she decided to seek out further evaluation in the emergency room.    In the emergency room she was evaluated with labs demonstrating a BNP 1223, troponin negative. CXR showed cardiomegaly with increased prominence of vascular and interstitial markings.  Given her low oxygen level and chest x-ray findings she was admitted to the hospital for further evaluation for concerns of acute decompensated congestive heart failure exacerbation.  She was given IV diuretics and admitted to the telemetry floor.  Over the last 48 hours she has had improvement in her symptoms after initiation of IV diuretics and was  transitioned over to oral diuretics today.  Although her documented I's and O's have been inconsistent her weight has significantly decreased.  She is still very short of breath but improved upon admission.    She was previously seen by Dr Pruitt in March 2016 with symptoms of CHF and BLE edema.  She reported at that time she had a history of tachycardia and took propanolol for many years.  She was told at one point that she had an irregular heart rate. She was started on lasix and she was switched from propanolol to carvedilol. ECHO done at that time showed EF 50% with normal contractility.     We have been consulted for CHF.  Repeat ECHO has been orderd      Previous Cardiac Testing   ECHO 03/30/2015  · All left ventricular wall segments contract normally.  · Left ventricular function is normal. Estimated EF = 50%.    Past Medical History:   Diagnosis Date   • Atrial fibrillation (CMS/HCC)    • CHF (congestive heart failure) (CMS/HCC)    • Compromised renal function    • Oxygen dependent     2L at all times       Past Surgical History:   Procedure Laterality Date   • BREAST SURGERY      Lumpectomy and abscess removal   • EYE SURGERY           Prior to Admission medications    Medication Sig Start Date End Date Taking? Authorizing Provider   ALPRAZolam (XANAX) 0.5 MG tablet Take 1 tablet by mouth 3 (Three) Times a Day As Needed for Anxiety. 8/29/19  Yes Migdalia Espitia MD   carvedilol (COREG) 25 MG tablet TAKE 1 TABLET BY MOUTH TWICE A DAY WITH MEALS 8/19/19  Yes Migdalia Espitia MD   furosemide (LASIX) 20 MG tablet TAKE 1 TABLET BY MOUTH EVERY DAY 8/20/19  Yes Migdalia Espitia MD   KLOR-CON 10 MEQ CR tablet Take 1 tablet by mouth 2 (Two) Times a Day. 6/26/19  Yes Migdalia Espitia MD   lisinopril (PRINIVIL,ZESTRIL) 5 MG tablet Take 5 mg by mouth Daily. 5/19/19  Yes Provider, Anna, MD       Allergies   Allergen Reactions   • Penicillins      Pt unsure of reaction       Social History     Socioeconomic History   •  Marital status: Unknown     Spouse name: Not on file   • Number of children: Not on file   • Years of education: Not on file   • Highest education level: Not on file   Tobacco Use   • Smoking status: Former Smoker     Packs/day: 1.50     Years: 60.00     Pack years: 90.00     Types: Cigarettes     Last attempt to quit: 3/31/2008     Years since quittin.4   • Smokeless tobacco: Never Used   Substance and Sexual Activity   • Alcohol use: No   • Drug use: No     Comment: + CAFFEINE   Social History Narrative    - lives at home with spouse       Family History   Problem Relation Age of Onset   • Rheumatic fever Mother        REVIEW OF SYSTEMS:   All systems reviewed.  Pertinent positives identified in HPI.  All other systems are negative.      Objective:     Vitals:    09/15/19 2307 19 0555 19 0700 19 0822   BP: 123/52  135/68    BP Location: Right arm  Right arm    Patient Position: Lying  Lying    Pulse: 66  90 88   Resp: 18  18    Temp: 98.4 °F (36.9 °C)  97.7 °F (36.5 °C)    TempSrc: Oral  Oral    SpO2: 97%      Weight:  56.7 kg (125 lb 1.6 oz)     Height:         Body mass index is 19.03 kg/m².     Physical Exam  Constitutional:  Frail-appearing, chronically ill-appearing, no acute distress   HENT: Oropharynx clear and membrane moist, nasal cannula in place  Eyes: Normal conjunctiva, no sclera icterus.  Neck: Supple, no carotid bruit bilaterally.  Cardiovascular: Irregularly irregular rate and rhythm, no Murmur, No bilateral lower extremity edema.  Pulmonary: Normal respiratory effort, distant lung sounds, no wheezing.  Crackles at bases bilaterally.  Abdominal: Soft, nontender, no hepatosplenomegaly, liver is non-pulsatile.  Neurological: Alert and orient x 3.   Skin: Warm, dry, no ecchymosis, no rash.  Psych: Appropriate mood and affect. Normal judgment and insight.      Lab Review:     Results from last 7 days   Lab Units 19  0440  19  1255   SODIUM mmol/L 136   < > 137    POTASSIUM mmol/L 4.3   < > 4.5   CHLORIDE mmol/L 88*   < > 93*   CO2 mmol/L 38.3*   < > 33.9*   BUN mg/dL 29*   < > 25*   CREATININE mg/dL 1.19*   < > 1.16*   CALCIUM mg/dL 9.2   < > 8.7   BILIRUBIN mg/dL  --   --  0.5   ALK PHOS U/L  --   --  72   ALT (SGPT) U/L  --   --  6   AST (SGOT) U/L  --   --  14   GLUCOSE mg/dL 75   < > 101*    < > = values in this interval not displayed.     Results from last 7 days   Lab Units 09/14/19  1255   TROPONIN T ng/mL <0.010     Results from last 7 days   Lab Units 09/15/19  0455   WBC 10*3/mm3 4.99   HEMOGLOBIN g/dL 11.2*   HEMATOCRIT % 37.5   PLATELETS 10*3/mm3 146                         CXR  IMPRESSION:  Cardiomegaly with increased prominence of vascular and  interstitial markings, minimal left pleural effusion.    Telemetry      Telemetry demonstrates mainly sinus rhythm with episodes of multifocal atrial tachycardia heart rates in the 100s to 120s.    EKG    EKG baseline      I personally viewed and interpreted the patient's EKG/Telemetry data.        Assessment and Plan:   Ms Kwok is an 85 year old patient with a history of possible paroxysmal atrial fibrillation versus multifocal atrial tachycardia, chronic lung disease on home oxygen, chronic diastolic congestive heart failure, and hypertension who presents to the hospital with likely exacerbation of her chronic diastolic congestive heart failure.  We do have a repeat echocardiogram ordered for today and we will follow-up on the results of this to ensure no changes in left ventricular function.  This will help us further guide diuretic dosing.    1.  Acute on chronic diastolic congestive heart failure:  - Will follow up on echo results  - Continue current dose of oral diuretics and monitor patient's response  - Follow-up on repeat chest x-ray in a.m.  - Repeat BNP level in a.m.    2.  Multifocal atrial tachycardia:  - Continue beta-blocker use  - Unclear if this was her underlying rhythm or if prior history of  paroxysmal atrial fibrillation  - Patient with prior discussion regarding risk of anticoagulation and stroke risk however given history of hemorrhage from hemorrhoids in the past she has deferred anticoagulation in the past.  Currently no indication to change this is no atrial fibrillation currently seen.    3.  Hypertension:  - Pressure well controlled      Thank you for allowing me to participate in the care of Lucy Duane. Feel free to contact me directly with any further questions or concerns.    Danilo Sinha MD  [facility]  09/16/19  11:05 AM      EMR Dragon/Transcription disclaimer:   Much of this encounter note is an electronic transcription/translation of spoken language to printed text. The electronic translation of spoken language may permit erroneous, or at times, nonsensical words or phrases to be inadvertently transcribed; Although I have reviewed the note for such errors, some may still exist.

## 2019-09-17 ENCOUNTER — APPOINTMENT (OUTPATIENT)
Dept: GENERAL RADIOLOGY | Facility: HOSPITAL | Age: 84
End: 2019-09-17

## 2019-09-17 VITALS
BODY MASS INDEX: 19.05 KG/M2 | HEIGHT: 68 IN | OXYGEN SATURATION: 96 % | WEIGHT: 125.66 LBS | DIASTOLIC BLOOD PRESSURE: 69 MMHG | TEMPERATURE: 97.4 F | HEART RATE: 83 BPM | SYSTOLIC BLOOD PRESSURE: 147 MMHG | RESPIRATION RATE: 18 BRPM

## 2019-09-17 LAB
ANION GAP SERPL CALCULATED.3IONS-SCNC: 11.2 MMOL/L (ref 5–15)
BUN BLD-MCNC: 32 MG/DL (ref 8–23)
BUN/CREAT SERPL: 25.6 (ref 7–25)
CALCIUM SPEC-SCNC: 9.2 MG/DL (ref 8.6–10.5)
CHLORIDE SERPL-SCNC: 89 MMOL/L (ref 98–107)
CO2 SERPL-SCNC: 38.8 MMOL/L (ref 22–29)
CREAT BLD-MCNC: 1.25 MG/DL (ref 0.57–1)
GFR SERPL CREATININE-BSD FRML MDRD: 41 ML/MIN/1.73
GLUCOSE BLD-MCNC: 85 MG/DL (ref 65–99)
NT-PROBNP SERPL-MCNC: 503.6 PG/ML (ref 5–1800)
POTASSIUM BLD-SCNC: 4.5 MMOL/L (ref 3.5–5.2)
SODIUM BLD-SCNC: 139 MMOL/L (ref 136–145)

## 2019-09-17 PROCEDURE — 83880 ASSAY OF NATRIURETIC PEPTIDE: CPT | Performed by: INTERNAL MEDICINE

## 2019-09-17 PROCEDURE — 99214 OFFICE O/P EST MOD 30 MIN: CPT | Performed by: INTERNAL MEDICINE

## 2019-09-17 PROCEDURE — G0378 HOSPITAL OBSERVATION PER HR: HCPCS

## 2019-09-17 PROCEDURE — 80048 BASIC METABOLIC PNL TOTAL CA: CPT | Performed by: INTERNAL MEDICINE

## 2019-09-17 PROCEDURE — 71046 X-RAY EXAM CHEST 2 VIEWS: CPT

## 2019-09-17 RX ORDER — FUROSEMIDE 40 MG/1
40 TABLET ORAL DAILY
Qty: 30 TABLET | Refills: 0 | Status: SHIPPED | OUTPATIENT
Start: 2019-09-17 | End: 2019-10-21 | Stop reason: SDUPTHER

## 2019-09-17 RX ADMIN — ALPRAZOLAM 0.5 MG: 0.5 TABLET ORAL at 09:24

## 2019-09-17 RX ADMIN — LISINOPRIL 5 MG: 5 TABLET ORAL at 09:22

## 2019-09-17 RX ADMIN — FUROSEMIDE 40 MG: 40 TABLET ORAL at 09:22

## 2019-09-17 RX ADMIN — CARVEDILOL 25 MG: 25 TABLET, FILM COATED ORAL at 09:22

## 2019-09-17 RX ADMIN — POTASSIUM CHLORIDE 20 MEQ: 750 CAPSULE, EXTENDED RELEASE ORAL at 09:22

## 2019-09-17 NOTE — DISCHARGE SUMMARY
NAME: Lucy Duane ADMIT: 2019   : 1934  PCP: Migdalia Espitia MD    MRN: [id number] LOS: 0 days   AGE/SEX: 85 y.o. female  ROOM: E459/1     Date of Admission:  2019  Date of Discharge:  2019    PCP: Migdalia Espitia MD    CHIEF COMPLAINT  Weakness - Generalized; Shortness of Breath; and Cough      DISCHARGE DIAGNOSIS  Active Hospital Problems    Diagnosis  POA   • **Acute on chronic diastolic CHF (congestive heart failure) (CMS/HCC) [I50.33]  Yes   • Pulmonary nodules [R91.8]  Yes   • Atrial fibrillation (CMS/HCC) [I48.91]  Yes   • HTN (hypertension) [I10]  Yes      Resolved Hospital Problems   No resolved problems to display.       SECONDARY DIAGNOSES  Past Medical History:   Diagnosis Date   • Atrial fibrillation (CMS/HCC)    • CHF (congestive heart failure) (CMS/HCC)    • Compromised renal function    • Oxygen dependent     2L at all times       CONSULTS   Cardiology    HOSPITAL COURSE  Ms. Duane is a 85 y.o. former smoker with a history of afib (no AC) vs multifocal atrial tachycardia, dCHF and CKD that presents to [facility] complaining of SOA and cough.  She states symptoms have been present for several months but her son says she is been more short of breath and usual for the last 10 days.     She was admitted and started on IV diuretics.  She had improvement of her dyspnea and her fluid overload.  She is on chronic oxygen.  She felt improved and was ready for discharge on .  Cardiology recommended increase of her Lasix from 20 mg p.o. daily to 40 mg p.o. daily.  Her echo was stable.    DIAGNOSTICS    2019 0404  2019 0546  BNP [[id number]]    Blood     Final result  proBNP 503.6 pg/mL          2019 0404  2019 0814  Basic Metabolic Panel [[id number]]    (Abnormal)   Blood     Final result  Glucose 85 mg/dL   BUN 32 Abnormally high  mg/dL   Creatinine 1.25 Abnormally high  mg/dL   Sodium 139 mmol/L   Potassium 4.5 mmol/L   Chloride 89  Abnormally low  mmol/L   CO2 38.8 Abnormally high  mmol/L   Calcium 9.2 mg/dL   eGFR Non  Am 41 Abnormally low  mL/min/1.73   BUN/Creatinine Ratio 25.6 Abnormally high     Anion Gap 11.2 mmol/L        09/15/2019 0455  09/15/2019 0519  CBC (No Diff) [[id number]]   (Abnormal)   Blood     Final result  WBC 4.99 10*3/mm3   RBC 3.72 Abnormally low  10*6/mm3   Hemoglobin 11.2 Abnormally low  g/dL   Hematocrit 37.5 %   .8 Abnormally high  fL   MCH 30.1 pg   MCHC 29.9 Abnormally low  g/dL   RDW 13.2 %   RDW-SD 49.5 fl   MPV 10.1 fL   Platelets 146 10*3/mm3          09/14/2019 1422  09/14/2019 1431  Urinalysis With Culture If Indicated - Urine, Catheter [[id number]]    Urine, Catheter     Final result  Color, UA Yellow   Appearance, UA Clear   pH, UA 6.5   Specific Gravity, UA 1.007   Glucose Negative   Ketones, UA Negative   Bilirubin, UA Negative   Blood, UA Negative   Protein, UA Negative   Leukocytes, UA Negative   Nitrite, UA Negative   Urobilinogen, UA 0.2 E.U./dL          PHYSICAL EXAM  Objective    Alert  nad  No resp distress  Decreased bs at bases  elderly    CONDITION ON DISCHARGE  Stable.      DISCHARGE DISPOSITION   Home or Self Care      DISCHARGE MEDICATIONS       Your medication list      CHANGE how you take these medications      Instructions Last Dose Given Next Dose Due   furosemide 40 MG tablet  Commonly known as:  LASIX  What changed:    · medication strength  · how much to take      Take 1 tablet by mouth Daily.          CONTINUE taking these medications      Instructions Last Dose Given Next Dose Due   ALPRAZolam 0.5 MG tablet  Commonly known as:  XANAX      Take 1 tablet by mouth 3 (Three) Times a Day As Needed for Anxiety.       carvedilol 25 MG tablet  Commonly known as:  COREG      TAKE 1 TABLET BY MOUTH TWICE A DAY WITH MEALS       KLOR-CON 10 MEQ CR tablet  Generic drug:  potassium chloride      Take 1 tablet by mouth 2 (Two) Times a Day.       lisinopril 5 MG tablet  Commonly known  as:  DANIEL,ZESTRIL      Take 5 mg by mouth Daily.             Where to Get Your Medications      These medications were sent to [facility]  [address], [place] [address]    Hours:  7:00AM-6PM Mon-Fri Phone:  [phone]   · furosemide 40 MG tablet          Future Appointments   Date Time Provider Department Center   11/20/2019  4:00 PM Migdalia Espitia MD [facility]     Follow-up Information     Danilo Sinha MD Follow up in 3 week(s).    Specialty:  Cardiology  Why:  Call and make a follow up appointment for 2-3 weeks  Contact information:  [address]  [address]  [place] [address]  [phone]             Migdalia Espitia MD .    Specialty:  Internal Medicine  Contact information:  [address]  [place] [address]  [phone]                   TEST  RESULTS PENDING AT DISCHARGE         Riki Munoz MD  [facility]  09/17/19  10:53 AM      It was a pleasure taking care of this patient while in the hospital.

## 2019-09-17 NOTE — PROGRESS NOTES
Discharge Planning Assessment  [facility]     Patient Name: Lucy Duane  MRN: [id number]  Today's Date: 9/17/2019    Admit Date: 9/14/2019    Discharge Needs Assessment     Row Name 09/17/19 1233       Living Environment    Lives With  alone    Current Living Arrangements  home/apartment/condo    Primary Care Provided by  self    Provides Primary Care For  no one    Family Caregiver if Needed  child(noelle), adult    Family Caregiver Names  son    Able to Return to Prior Arrangements  yes       Resource/Environmental Concerns    Resource/Environmental Concerns  none       Transition Planning    Patient/Family Anticipates Transition to  home    Patient/Family Anticipated Services at Transition  durable medical equipment;home health care    Transportation Anticipated  family or friend will provide       Discharge Needs Assessment    Readmission Within the Last 30 Days  no previous admission in last 30 days    Concerns to be Addressed  discharge planning;care coordination/care conferences;decision making    Equipment Currently Used at Home  oxygen;cane, straight    Anticipated Changes Related to Illness  none    Equipment Needed After Discharge  oxygen Pt states she needs a portable oxygen tank to transport on.  Irina with [facility] notified    Offered/Gave Vendor List  yes    Current Discharge Risk  chronically ill;lives alone;lack of support system/caregiver        Discharge Plan     Row Name 09/17/19 1308       Plan    Plan  Home with [facility] and Oxygen through [facility]    Patient/Family in Agreement with Plan  yes    Plan Comments  Met with pt. at bedside. Explained roll of . Face sheet and pharmacy verified. Pt lives alone and has 3 steps to enter home.  DME equipment: Home oxygen @2LNC and a cane that she states she rarely uses.  States she has used HH in the past but does not recall name.  States she has been to [facility] for rehab.  At discharge, family will transport.  Pt states she wants HH at  discharge.  Pt chose [facility] as her provider. Bonnie with [facility] informed and has accepted.  Pt also states she needs a portable oxygen tank and gets her oxygen through [facility].  Irina with [facility] up to floor and portable tank given to pt.  Jeff Rodriguez RN-BC         Destination      No service coordination in this encounter.      Durable Medical Equipment - Selection Complete      Service Provider Request Status Selected Services Address Phone Number Fax Number    [facility] [place] Selected Durable Medical Equipment [address], [facility] [address] [phone]      Dialysis/Infusion      No service coordination in this encounter.      Home Medical Care      Service Provider Request Status Selected Services Address Phone Number Fax Number    [facility] [place] Accepted N/A [address] [id number] [phone]      Therapy      No service coordination in this encounter.      Community Resources      No service coordination in this encounter.        Expected Discharge Date and Time     Expected Discharge Date Expected Discharge Time    Sep 17, 2019         Demographic Summary     Row Name 09/17/19 1231       General Information    Admission Type  observation    Arrived From  emergency department    Required Notices Provided  Observation Status Notice    Reason for Consult  discharge planning;care coordination/care conference;decision making    Preferred Language  English     Used During This Interaction  no        Functional Status     Row Name 09/17/19 1233       Functional Status    Usual Activity Tolerance  moderate    Current Activity Tolerance  moderate       Functional Status, IADL    Medications  independent    Meal Preparation  independent    Housekeeping  independent    Laundry  independent    Shopping  independent       Mental Status    General Appearance WDL  WDL       Mental Status Summary     Recent Changes in Mental Status/Cognitive Functioning  no changes        Psychosocial    No documentation.       Abuse/Neglect    No documentation.       Legal    No documentation.       Substance Abuse    No documentation.       Patient Forms     Row Name 09/17/19 4296       Patient Forms    Provider Choice List  Delivered    Delivered to  Patient    Method of delivery  In person Road to recovery and list of  agencies given.              Jeff Rodriguez RN

## 2019-09-17 NOTE — DISCHARGE PLACEMENT REQUEST
"Duane, Lucy (85 y.o. Female)     Date of Birth Social Security Number Address Home Phone MRN    1934  [address] [id number] [phone] [id number]    [employer] Marital Status          Muslim Unknown       Admission Date Admission Type Admitting Provider Attending Provider Department, Room/Bed    9/14/19 Emergency Tex Kaye MD Jackson, Alan David, MD [address], E459/1    Discharge Date Discharge Disposition Discharge Destination         Home or Self Care              Attending Provider:  Riki Munoz MD    Allergies:  Penicillins    Isolation:  None   Infection:  None   Code Status:  CPR    Ht:  172.7 cm (67.99\")   Wt:  57 kg (125 lb 10.6 oz)    Admission Cmt:  None   Principal Problem:  Acute on chronic diastolic CHF (congestive heart failure) (CMS/[facility]) [I50.33]                 Active Insurance as of 9/14/2019     Primary Coverage     Payor Plan Insurance Group Employer/Plan Group    MEDICARE MEDICARE A & B      Payor Plan Address Payor Plan Phone Number Payor Plan Fax Number Effective Dates    [address] [phone]  8/1/1999 - None Entered    [facility] [address]       Subscriber Name Subscriber Birth Date Member ID       DUANE,LUCY 1934 [id number]           Secondary Coverage     Payor Plan Insurance Group Employer/Plan Group    [facility] SUPP [id number]     Payor Plan Address Payor Plan Phone Number Payor Plan Fax Number Effective Dates    [address]   7/1/2003 - None Entered    [facility] [id number]       Subscriber Name Subscriber Birth Date Member ID       DUANE,LUCY 1934 [id number]                 Emergency Contacts      (Rel.) Home Phone Work Phone Mobile Phone    Duane,Dan (Son) -- [phone]              "

## 2019-09-17 NOTE — PROGRESS NOTES
[facility] Progress Note       Encounter Date:19  Patient:Lucy Duane  :1934  MRN:[id number]      Chief Complaint:  Short of breath and weak    Subjective:      Doing about the same and no new issues but remains weak.  Tolerating current medication dosing.      Review of Systems:  +nasal drip  Generalize muscle aches    Medications:    Current Facility-Administered Medications:   •  acetaminophen (TYLENOL) tablet 650 mg, 650 mg, Oral, Q4H PRN **OR** acetaminophen (TYLENOL) 160 MG/5ML solution 650 mg, 650 mg, Oral, Q4H PRN **OR** acetaminophen (TYLENOL) suppository 650 mg, 650 mg, Rectal, Q4H PRN, Tex Kaye MD  •  ALPRAZolam (XANAX) tablet 0.5 mg, 0.5 mg, Oral, TID PRN, Tex Kaye MD, 0.5 mg at 19 1337  •  carvedilol (COREG) tablet 25 mg, 25 mg, Oral, BID With Meals, Tex Kaye MD, 25 mg at 19  •  furosemide (LASIX) tablet 40 mg, 40 mg, Oral, Daily, Tex Kaye MD, 40 mg at 19 0822  •  lisinopril (PRINIVIL,ZESTRIL) tablet 5 mg, 5 mg, Oral, Daily, Tex Kaye MD, 5 mg at 19 0822  •  nitroglycerin (NITROSTAT) SL tablet 0.4 mg, 0.4 mg, Sublingual, Q5 Min PRN, Tex Kaye MD  •  ondansetron (ZOFRAN) tablet 4 mg, 4 mg, Oral, Q6H PRN **OR** ondansetron (ZOFRAN) injection 4 mg, 4 mg, Intravenous, Q6H PRN, Tex Kaye MD  •  potassium chloride (MICRO-K) CR capsule 20 mEq, 20 mEq, Oral, BID With Meals, Tex Kaye MD, 20 mEq at 19 1710       Objective:       Vitals:    19 2339 19 0500 19 0712   BP: 153/77 136/57     BP Location: Right arm Right arm     Patient Position: Lying Lying     Pulse: 95 91  83   Resp: 18 18     Temp: 98.3 °F (36.8 °C) 97.7 °F (36.5 °C)     TempSrc: Oral Oral  Oral   SpO2: 96% 97%  96%   Weight:   57 kg (125 lb 10.6 oz)    Height:               Physical Exam:  Constitutional:  Frail-appearing, chronically ill-appearing, no acute distress   Neck: Supple, no carotid bruit  bilaterally.  Cardiovascular: Irregularly irregular rate and rhythm, no Murmur, No bilateral lower extremity edema.  Pulmonary: Normal respiratory effort, distant lung sounds, no wheezing.  Abdominal: Soft, nontender, no hepatosplenomegaly, liver is non-pulsatile.  Neurological: Alert and orient x 3.   Skin: Warm, dry, no ecchymosis, no rash.  Psych: Appropriate mood and affect. Normal judgment and insight.          Lab Review:   Lab Results   Component Value Date    GLUCOSE 75 09/16/2019    BUN 29 (H) 09/16/2019    CREATININE 1.19 (H) 09/16/2019    EGFRIFNONA 43 (L) 09/16/2019    BCR 24.4 09/16/2019    K 4.3 09/16/2019    CO2 38.3 (H) 09/16/2019    CALCIUM 9.2 09/16/2019    ALBUMIN 3.30 (L) 09/14/2019    AST 14 09/14/2019    ALT 6 09/14/2019       Lab Results   Component Value Date    WBC 4.99 09/15/2019    HGB 11.2 (L) 09/15/2019    HCT 37.5 09/15/2019    .8 (H) 09/15/2019     09/15/2019       Lab Results   Component Value Date    TROPONINT <0.010 09/14/2019       Lab Results   Component Value Date    TSH 3.650 03/30/2016     Telemetry reviewed by myself last 24 hours:  Sinus rhythm with episodes of likely MAT    Echocardiogram images reviewed by myself 9/16/2019:  1. Left ventricular systolic function is normal. Calculated EF = 57.0%. Estimated EF was in agreement with the calculated EF. Normal left ventricular cavity size noted. All left ventricular wall segments contract normally. Left ventricular wall thickness is consistent with borderline concentric hypertrophy. Left ventricular diastolic function was indeterminate. Normal left atrial pressure  2. Normal left atrial size noted.  3. Estimated right ventricular systolic pressure from tricuspid regurgitation is normal (<35 mmHg).    Assessment:          Diagnosis Plan   1. Acute on chronic diastolic CHF (congestive heart failure) (CMS/HCC)     2. Chronic kidney disease, unspecified CKD stage     3. General weakness            Plan:       Ms Kwok  is an 85 year old patient with a history of possible paroxysmal atrial fibrillation versus multifocal atrial tachycardia, chronic lung disease on home oxygen, chronic diastolic congestive heart failure, and hypertension who presents to the hospital with an exacerbation of her chronic diastolic congestive heart failure.  Her repeat echo demonstrates stable findings from her prior study.      Her BNP is improved and will follow up on CXR but from a CHF perspective and cardiac perspective no changes are needed at this time and would be fine for hospital D/C if CXR andBMP are improved and remained stable on higher lasix dosing.    1.  Acute on chronic diastolic congestive heart failure:  - Improved with IV and then higher dose PO lasix  - Follow up on BMP  - Follow up on CXR  - Echo stable  - Continue current dose of oral diuretics      2.  Multifocal atrial tachycardia:  - Continue beta-blocker use  - Unclear if this was her underlying rhythm or if prior history of paroxysmal atrial fibrillation  - Patient with prior discussion regarding risk of anticoagulation and stroke risk however given history of hemorrhage from hemorrhoids in the past she has deferred anticoagulation in the past.  Currently no indication to change this is no atrial fibrillation currently seen.     3.  Hypertension:  - Pressure well controlled        Thank you for allowing me to participate in the care of Lucy Duane. Feel free to contact me directly with any further questions or concerns.     Danilo Sinha MD  [facility]  09/17/19  7:42 AM

## 2019-09-17 NOTE — PROGRESS NOTES
Continued Stay Note  [facility]     Patient Name: Lucy Duane  MRN: [id number]  Today's Date: 9/17/2019    Admit Date: 9/14/2019    Discharge Plan     Row Name 09/17/19 1308       Plan    Plan  Home with [facility] and Oxygen through [facility]    Patient/Family in Agreement with Plan  yes    Plan Comments  Met with pt. at bedside. Explained roll of . Face sheet and pharmacy verified. Pt lives alone and has 3 steps to enter home.  DME equipment: Home oxygen @2LNC and a cane that she states she rarely uses.  States she has used HH in the past but does not recall name.  States she has been to [facility] for rehab.  At discharge, family will transport.  Pt states she wants HH at discharge.  Pt chose [facility] as her provider. Bonnie with [facility] informed and has accepted.  Pt also states she needs a portable oxygen tank and gets her oxygen through [facility].  Irina with [facility] up to floor and portable tank given to pt.  Jeff Rodriguez RN-BC         Discharge Codes    No documentation.       Expected Discharge Date and Time     Expected Discharge Date Expected Discharge Time    Sep 17, 2019             Jeff Rodriguez RN

## 2019-09-17 NOTE — PLAN OF CARE
Problem: Fall Risk (Adult)  Goal: Absence of Fall  Outcome: Ongoing (interventions implemented as appropriate)      Problem: Skin Injury Risk (Adult)  Goal: Skin Health and Integrity  Outcome: Ongoing (interventions implemented as appropriate)      Problem: Patient Care Overview  Goal: Plan of Care Review  Outcome: Ongoing (interventions implemented as appropriate)   09/17/19 1848   Plan of Care Review   Progress improving   OTHER   Outcome Summary VSS- pt on 2L O2. Pt c/o back pain- refused pain medication. Pt resting well. Continue to monitor.   Coping/Psychosocial   Plan of Care Reviewed With patient     Goal: Individualization and Mutuality  Outcome: Ongoing (interventions implemented as appropriate)    Goal: Discharge Needs Assessment  Outcome: Ongoing (interventions implemented as appropriate)    Goal: Interprofessional Rounds/Family Conf  Outcome: Ongoing (interventions implemented as appropriate)      Problem: Cardiac: Heart Failure (Adult)  Goal: Signs and Symptoms of Listed Potential Problems Will be Absent, Minimized or Managed (Cardiac: Heart Failure)  Outcome: Ongoing (interventions implemented as appropriate)

## 2019-09-17 NOTE — SIGNIFICANT NOTE
09/17/19 1102   PT Discharge Summary   Reason for Discharge Discharge from facility   Discharge Destination Home

## 2019-09-18 ENCOUNTER — READMISSION MANAGEMENT (OUTPATIENT)
Dept: CALL CENTER | Facility: HOSPITAL | Age: 84
End: 2019-09-18

## 2019-09-18 NOTE — OUTREACH NOTE
Prep Survey      Responses   Facility patient discharged from?  [place]   Is patient eligible?  Yes   Discharge diagnosis  SOA on admission, CHF exac, Oxygen dependent @ 2L at all times   Does the patient have one of the following disease processes/diagnoses(primary or secondary)?  CHF   Does the patient have Home health ordered?  Yes   What is the Home health agency?   [facility]    Is there a DME ordered?  Yes   What DME was ordered?  Already has O2 from [facility], uses a straight cane,    Medication alerts for this patient  Change how to take Lasix   Prep survey completed?  Yes          Alondra Leahy RN

## 2019-09-18 NOTE — PROGRESS NOTES
Case Management Discharge Note    Final Note: Home with [facility] and Oxygen through [facility].  Transported home by son    Destination      No service has been selected for the patient.      Durable Medical Equipment - Selection Complete      Service Provider Request Status Selected Services Address Phone Number Fax Number    [facility] Selected Durable Medical Equipment [address], [facility] [id number] [phone]      Dialysis/Infusion      No service has been selected for the patient.      Home Medical Care - Selection Complete      Service Provider Request Status Selected Services Address Phone Number Fax Number    [facility] Selected Home Health Services [address], [facility] [address] [phone]      Therapy      No service has been selected for the patient.      Community Resources      No service has been selected for the patient.        Transportation Services  Private: Car    Final Discharge Disposition Code: 06 - home with home health care

## 2019-09-19 ENCOUNTER — READMISSION MANAGEMENT (OUTPATIENT)
Dept: CALL CENTER | Facility: HOSPITAL | Age: 84
End: 2019-09-19

## 2019-09-19 NOTE — OUTREACH NOTE
CHF Week 1 Survey      Responses   Facility patient discharged from?  [place]   Does the patient have one of the following disease processes/diagnoses(primary or secondary)?  CHF   Is there a successful TCM telephone encounter documented?  No   CHF Week 1 attempt successful?  Yes   Call start time  0931   Call end time  0943   Discharge diagnosis  Acute on chronic diastolic CHF    Is patient permission given to speak with other caregiver?  Yes   List who call center can speak with  Dan Duane, son   Person spoke with today (if not patient) and relationship  fazal Perrin   Meds reviewed with patient/caregiver?  Yes   Is the patient having any side effects they believe may be caused by any medication additions or changes?  No   Does the patient have all medications ordered at discharge?  Yes   Is the patient taking all medications as directed (includes completed medication regime)?  Yes   Does the patient have a primary care provider?   Yes   Does the patient have an appointment with their PCP within 7 days of discharge?  No   Comments regarding PCP  Migdalia Espitia MD, PCP   What is preventing the patient from scheduling follow up appointments within 7 days of discharge?  Haven't had time   Nursing Interventions  Educated patient on importance of making appointment, Advised patient to make appointment   Has the patient kept scheduled appointments due by today?  N/A   Comments  Hospital Follow Up with Danilo Sinha MD, Wednesday Oct 2, 2019 3:15 PM    [facility] health agency?   [facility]    Has home health visited the patient within 72 hours of discharge?  Yes   Psychosocial issues?  No   Did the patient receive a copy of their discharge instructions?  Yes   Nursing interventions  Reviewed instructions with patient   What is the patient's perception of their health status since discharge?  Improving   Nursing interventions  Nurse provided patient education   Is the patient weighing daily?  Yes   Does the  patient have scales?  Yes   Daily weight interventions  Education provided on importance of daily weight   Is the patient able to teach back Heart Failure diet management?  Yes   Is the patient able to teach back Heart Failure Zones?  No [Unable to do zone teaching with son today. ]   Is the patient able to teach back signs and symptoms of worsening condition? (i.e. weight gain, shortness of air, etc.)  Yes   Is the patient/caregiver able to teach back the hierarchy of who to call/visit for symptoms/problems? PCP, Specialist, Home health nurse, Urgent Care, ED, 911  Yes    CHF Week 1 call completed?  Yes          Irina Eng RN

## 2019-09-26 RX ORDER — POTASSIUM CHLORIDE 750 MG/1
TABLET, EXTENDED RELEASE ORAL
Qty: 180 TABLET | Refills: 2 | Status: SHIPPED | OUTPATIENT
Start: 2019-09-26 | End: 2020-01-26

## 2019-09-27 ENCOUNTER — READMISSION MANAGEMENT (OUTPATIENT)
Dept: CALL CENTER | Facility: HOSPITAL | Age: 84
End: 2019-09-27

## 2019-09-27 NOTE — OUTREACH NOTE
CHF Week 2 Survey      Responses   Facility patient discharged from?  [place]   Does the patient have one of the following disease processes/diagnoses(primary or secondary)?  CHF   Week 2 attempt successful?  No   Unsuccessful attempts  Attempt 1          Belkys Cloud RN

## 2019-10-01 ENCOUNTER — READMISSION MANAGEMENT (OUTPATIENT)
Dept: CALL CENTER | Facility: HOSPITAL | Age: 84
End: 2019-10-01

## 2019-10-01 NOTE — OUTREACH NOTE
CHF Week 2 Survey      Responses   Facility patient discharged from?  [place]   Does the patient have one of the following disease processes/diagnoses(primary or secondary)?  CHF   Week 2 attempt successful?  Yes   Call start time  0953   Call end time  1004   Discharge diagnosis  Acute on chronic diastolic CHF    Person spoke with today (if not patient) and relationship   nurse, Aide Carbajal reviewed with patient/caregiver?  Yes   Is the patient having any side effects they believe may be caused by any medication additions or changes?  No   Does the patient have all medications ordered at discharge?  Yes   Is the patient taking all medications as directed (includes completed medication regime)?  Yes   Does the patient have a primary care provider?   Yes   Does the patient have an appointment with their PCP within 7 days of discharge?  No [ nurse states pt has not made appt with PCP, Aide plans to contact son and have him make appt, and remind son of pt's f/u card appt on 10/2/19]   What is preventing the patient from scheduling follow up appointments within 7 days of discharge?  Haven't had time, Unsure of when or with whom   Nursing Interventions  Advised patient to make appointment, Educated patient on importance of making appointment   Has the patient kept scheduled appointments due by today?  N/A   What is the Home health agency?   [facility]    Has home health visited the patient within 72 hours of discharge?  Yes   Psychosocial issues?  No   Comments  HH nurse states pt occasionally takes off O2 and O2 sats drop,  nurse has been advising pt to keep o2 on at all times   Did the patient receive a copy of their discharge instructions?  Yes   Nursing interventions  Reviewed instructions with patient   What is the patient's perception of their health status since discharge?  Improving   Nursing interventions  Nurse provided patient education   Is the patient weighing daily?  Yes   Does the  patient have scales?  Yes   Is the patient able to teach back Heart Failure diet management?  Yes   Is the patient able to teach back Heart Failure Zones?  Yes   Is the patient able to teach back signs and symptoms of worsening condition? (i.e. weight gain, shortness of air, etc.)  Yes   Is the patient/caregiver able to teach back the hierarchy of who to call/visit for symptoms/problems? PCP, Specialist, Home health nurse, Urgent Care, ED, 911  Yes   Additional teach back comments   nurseAide states has been counseling pt on O2 use and monitoring for increase in edema, etc, plans to speak to son to reinforce   CHF Week 2 call completed?  Yes          Stephanie Rockwell, RN

## 2019-10-08 ENCOUNTER — READMISSION MANAGEMENT (OUTPATIENT)
Dept: CALL CENTER | Facility: HOSPITAL | Age: 84
End: 2019-10-08

## 2019-10-08 NOTE — OUTREACH NOTE
CHF Week 3 Survey      Responses   Facility patient discharged from?  [place]   Does the patient have one of the following disease processes/diagnoses(primary or secondary)?  CHF   Week 3 attempt successful?  No   Unsuccessful attempts  Attempt 1          Madhuri Anderson RN

## 2019-10-10 ENCOUNTER — READMISSION MANAGEMENT (OUTPATIENT)
Dept: CALL CENTER | Facility: HOSPITAL | Age: 84
End: 2019-10-10

## 2019-10-10 NOTE — OUTREACH NOTE
CHF Week 3 Survey      Responses   Facility patient discharged from?  [facility]   Does the patient have one of the following disease processes/diagnoses(primary or secondary)?  CHF   Week 3 attempt successful?  Yes   Call start time  1404   Call end time  1412   Discharge diagnosis  Acute on chronic diastolic CHF    Is patient permission given to speak with other caregiver?  Yes   List who call center can speak with  fazal Perrin reviewed with patient/caregiver?  Yes   Does the patient have all medications ordered at discharge?  Yes   Is the patient taking all medications as directed (includes completed medication regime)?  Yes   Does the patient have a primary care provider?   Yes   Has the patient kept scheduled appointments due by today?  Yes   Home health comments  Patient states today was HH last day.    Psychosocial issues?  No   Did the patient receive a copy of their discharge instructions?  Yes   Nursing interventions  Reviewed instructions with patient   What is the patient's perception of their health status since discharge?  Improving   Nursing interventions  Nurse provided patient education   Is the patient weighing daily?  Yes   Does the patient have scales?  Yes   Is the patient able to teach back Heart Failure diet management?  Yes   Is the patient able to teach back Heart Failure Zones?  Yes   Is the patient able to teach back signs and symptoms of worsening condition? (i.e. weight gain, shortness of air, etc.)  Yes   Is the patient/caregiver able to teach back the hierarchy of who to call/visit for symptoms/problems? PCP, Specialist, Home health nurse, Urgent Care, ED, 911  Yes   CHF Week 3 call completed?  Yes          Irina Eng RN

## 2019-10-21 ENCOUNTER — TELEPHONE (OUTPATIENT)
Dept: INTERNAL MEDICINE | Facility: CLINIC | Age: 84
End: 2019-10-21

## 2019-10-21 RX ORDER — FUROSEMIDE 40 MG/1
40 TABLET ORAL DAILY
Qty: 30 TABLET | Refills: 5 | Status: SHIPPED | OUTPATIENT
Start: 2019-10-21 | End: 2020-01-30 | Stop reason: SDUPTHER

## 2019-10-21 NOTE — TELEPHONE ENCOUNTER
Patient is wanting to get a prescription for Lasix 40 mg, instead of taking 2 20 mg, patient states it would be easier for her to keep up with, please and thank you.

## 2019-11-06 RX ORDER — LISINOPRIL 5 MG/1
5 TABLET ORAL DAILY
Qty: 90 TABLET | Refills: 1 | Status: SHIPPED | OUTPATIENT
Start: 2019-11-06 | End: 2020-01-28 | Stop reason: HOSPADM

## 2019-11-22 ENCOUNTER — RESULTS ENCOUNTER (OUTPATIENT)
Dept: INTERNAL MEDICINE | Facility: CLINIC | Age: 84
End: 2019-11-22

## 2019-11-22 DIAGNOSIS — I10 ESSENTIAL HYPERTENSION: ICD-10-CM

## 2019-11-25 RX ORDER — CARVEDILOL 25 MG/1
TABLET ORAL
Qty: 180 TABLET | Refills: 0 | Status: SHIPPED | OUTPATIENT
Start: 2019-11-25 | End: 2020-01-26

## 2020-01-26 ENCOUNTER — APPOINTMENT (OUTPATIENT)
Dept: CT IMAGING | Facility: HOSPITAL | Age: 85
End: 2020-01-26

## 2020-01-26 ENCOUNTER — APPOINTMENT (OUTPATIENT)
Dept: GENERAL RADIOLOGY | Facility: HOSPITAL | Age: 85
End: 2020-01-26

## 2020-01-26 ENCOUNTER — HOSPITAL ENCOUNTER (INPATIENT)
Facility: HOSPITAL | Age: 85
LOS: 2 days | Discharge: NURSING, REHAB OR LONG-TERM CARE | End: 2020-01-28
Attending: EMERGENCY MEDICINE | Admitting: INTERNAL MEDICINE

## 2020-01-26 DIAGNOSIS — I50.9 ACUTE CONGESTIVE HEART FAILURE, UNSPECIFIED HEART FAILURE TYPE (HCC): Primary | ICD-10-CM

## 2020-01-26 DIAGNOSIS — R10.9 RIGHT SIDED ABDOMINAL PAIN: ICD-10-CM

## 2020-01-26 DIAGNOSIS — N28.9 RENAL INSUFFICIENCY: ICD-10-CM

## 2020-01-26 DIAGNOSIS — I50.33 ACUTE ON CHRONIC DIASTOLIC CHF (CONGESTIVE HEART FAILURE) (HCC): ICD-10-CM

## 2020-01-26 DIAGNOSIS — R55 NEAR SYNCOPE: ICD-10-CM

## 2020-01-26 DIAGNOSIS — N18.9 CHRONIC KIDNEY DISEASE, UNSPECIFIED CKD STAGE: ICD-10-CM

## 2020-01-26 DIAGNOSIS — I71.40 ABDOMINAL AORTIC ANEURYSM (AAA) WITHOUT RUPTURE (HCC): ICD-10-CM

## 2020-01-26 PROBLEM — J44.9 COPD (CHRONIC OBSTRUCTIVE PULMONARY DISEASE) (HCC): Status: ACTIVE | Noted: 2020-01-26

## 2020-01-26 LAB
ALBUMIN SERPL-MCNC: 4 G/DL (ref 3.5–5.2)
ALBUMIN/GLOB SERPL: 1.1 G/DL
ALP SERPL-CCNC: 82 U/L (ref 39–117)
ALT SERPL W P-5'-P-CCNC: <5 U/L (ref 1–33)
ANION GAP SERPL CALCULATED.3IONS-SCNC: 15.1 MMOL/L (ref 5–15)
ARTERIAL PATENCY WRIST A: POSITIVE
AST SERPL-CCNC: 9 U/L (ref 1–32)
ATMOSPHERIC PRESS: 748.3 MMHG
BASE EXCESS BLDA CALC-SCNC: 12.9 MMOL/L (ref 0–2)
BASOPHILS # BLD AUTO: 0.02 10*3/MM3 (ref 0–0.2)
BASOPHILS NFR BLD AUTO: 0.4 % (ref 0–1.5)
BDY SITE: ABNORMAL
BILIRUB SERPL-MCNC: 0.6 MG/DL (ref 0.2–1.2)
BILIRUB UR QL STRIP: NEGATIVE
BUN BLD-MCNC: 36 MG/DL (ref 8–23)
BUN/CREAT SERPL: 25.7 (ref 7–25)
CALCIUM SPEC-SCNC: 9.4 MG/DL (ref 8.6–10.5)
CHLORIDE SERPL-SCNC: 86 MMOL/L (ref 98–107)
CLARITY UR: CLEAR
CO2 SERPL-SCNC: 35.9 MMOL/L (ref 22–29)
COLOR UR: YELLOW
CREAT BLD-MCNC: 1.4 MG/DL (ref 0.57–1)
DEPRECATED RDW RBC AUTO: 42.4 FL (ref 37–54)
EOSINOPHIL # BLD AUTO: 0.08 10*3/MM3 (ref 0–0.4)
EOSINOPHIL NFR BLD AUTO: 1.7 % (ref 0.3–6.2)
ERYTHROCYTE [DISTWIDTH] IN BLOOD BY AUTOMATED COUNT: 12 % (ref 12.3–15.4)
GAS FLOW AIRWAY: 3 LPM
GFR SERPL CREATININE-BSD FRML MDRD: 36 ML/MIN/1.73
GLOBULIN UR ELPH-MCNC: 3.7 GM/DL
GLUCOSE BLD-MCNC: 69 MG/DL (ref 65–99)
GLUCOSE UR STRIP-MCNC: NEGATIVE MG/DL
HCO3 BLDA-SCNC: 39.9 MMOL/L (ref 22–28)
HCT VFR BLD AUTO: 34.4 % (ref 34–46.6)
HGB BLD-MCNC: 10.9 G/DL (ref 12–15.9)
HGB UR QL STRIP.AUTO: NEGATIVE
IMM GRANULOCYTES # BLD AUTO: 0.01 10*3/MM3 (ref 0–0.05)
IMM GRANULOCYTES NFR BLD AUTO: 0.2 % (ref 0–0.5)
KETONES UR QL STRIP: NEGATIVE
LEUKOCYTE ESTERASE UR QL STRIP.AUTO: NEGATIVE
LIPASE SERPL-CCNC: 19 U/L (ref 13–60)
LYMPHOCYTES # BLD AUTO: 0.6 10*3/MM3 (ref 0.7–3.1)
LYMPHOCYTES NFR BLD AUTO: 12.8 % (ref 19.6–45.3)
MCH RBC QN AUTO: 30.4 PG (ref 26.6–33)
MCHC RBC AUTO-ENTMCNC: 31.7 G/DL (ref 31.5–35.7)
MCV RBC AUTO: 95.8 FL (ref 79–97)
MODALITY: ABNORMAL
MONOCYTES # BLD AUTO: 0.23 10*3/MM3 (ref 0.1–0.9)
MONOCYTES NFR BLD AUTO: 4.9 % (ref 5–12)
NEUTROPHILS # BLD AUTO: 3.75 10*3/MM3 (ref 1.7–7)
NEUTROPHILS NFR BLD AUTO: 80 % (ref 42.7–76)
NITRITE UR QL STRIP: NEGATIVE
NRBC BLD AUTO-RTO: 0 /100 WBC (ref 0–0.2)
NT-PROBNP SERPL-MCNC: 1179 PG/ML (ref 5–1800)
PCO2 BLDA: 61.5 MM HG (ref 35–45)
PH BLDA: 7.42 PH UNITS (ref 7.35–7.45)
PH UR STRIP.AUTO: <=5 [PH] (ref 5–8)
PLATELET # BLD AUTO: 170 10*3/MM3 (ref 140–450)
PMV BLD AUTO: 9.7 FL (ref 6–12)
PO2 BLDA: 102.9 MM HG (ref 80–100)
POTASSIUM BLD-SCNC: 4.4 MMOL/L (ref 3.5–5.2)
PROT SERPL-MCNC: 7.7 G/DL (ref 6–8.5)
PROT UR QL STRIP: NEGATIVE
RBC # BLD AUTO: 3.59 10*6/MM3 (ref 3.77–5.28)
SAO2 % BLDCOA: 97.8 % (ref 92–99)
SODIUM BLD-SCNC: 137 MMOL/L (ref 136–145)
SP GR UR STRIP: 1.01 (ref 1–1.03)
TOTAL RATE: 18 BREATHS/MINUTE
TROPONIN T SERPL-MCNC: <0.01 NG/ML (ref 0–0.03)
UROBILINOGEN UR QL STRIP: NORMAL
WBC NRBC COR # BLD: 4.69 10*3/MM3 (ref 3.4–10.8)

## 2020-01-26 PROCEDURE — 25010000002 ENOXAPARIN PER 10 MG: Performed by: INTERNAL MEDICINE

## 2020-01-26 PROCEDURE — 83880 ASSAY OF NATRIURETIC PEPTIDE: CPT | Performed by: EMERGENCY MEDICINE

## 2020-01-26 PROCEDURE — 85025 COMPLETE CBC W/AUTO DIFF WBC: CPT | Performed by: EMERGENCY MEDICINE

## 2020-01-26 PROCEDURE — 99285 EMERGENCY DEPT VISIT HI MDM: CPT

## 2020-01-26 PROCEDURE — 94799 UNLISTED PULMONARY SVC/PX: CPT

## 2020-01-26 PROCEDURE — 74176 CT ABD & PELVIS W/O CONTRAST: CPT

## 2020-01-26 PROCEDURE — 71046 X-RAY EXAM CHEST 2 VIEWS: CPT

## 2020-01-26 PROCEDURE — 36600 WITHDRAWAL OF ARTERIAL BLOOD: CPT

## 2020-01-26 PROCEDURE — 82803 BLOOD GASES ANY COMBINATION: CPT

## 2020-01-26 PROCEDURE — 81003 URINALYSIS AUTO W/O SCOPE: CPT | Performed by: EMERGENCY MEDICINE

## 2020-01-26 PROCEDURE — 36415 COLL VENOUS BLD VENIPUNCTURE: CPT

## 2020-01-26 PROCEDURE — 83690 ASSAY OF LIPASE: CPT | Performed by: EMERGENCY MEDICINE

## 2020-01-26 PROCEDURE — 80053 COMPREHEN METABOLIC PANEL: CPT | Performed by: EMERGENCY MEDICINE

## 2020-01-26 PROCEDURE — 93005 ELECTROCARDIOGRAM TRACING: CPT | Performed by: EMERGENCY MEDICINE

## 2020-01-26 PROCEDURE — 93010 ELECTROCARDIOGRAM REPORT: CPT | Performed by: INTERNAL MEDICINE

## 2020-01-26 PROCEDURE — 84484 ASSAY OF TROPONIN QUANT: CPT | Performed by: EMERGENCY MEDICINE

## 2020-01-26 RX ORDER — CARVEDILOL 25 MG/1
25 TABLET ORAL 2 TIMES DAILY WITH MEALS
Status: DISCONTINUED | OUTPATIENT
Start: 2020-01-26 | End: 2020-01-28

## 2020-01-26 RX ORDER — POTASSIUM CHLORIDE 750 MG/1
10 CAPSULE, EXTENDED RELEASE ORAL DAILY
Status: DISCONTINUED | OUTPATIENT
Start: 2020-01-27 | End: 2020-01-28 | Stop reason: HOSPADM

## 2020-01-26 RX ORDER — IPRATROPIUM BROMIDE AND ALBUTEROL SULFATE 2.5; .5 MG/3ML; MG/3ML
3 SOLUTION RESPIRATORY (INHALATION) EVERY 6 HOURS PRN
Status: DISCONTINUED | OUTPATIENT
Start: 2020-01-26 | End: 2020-01-28 | Stop reason: HOSPADM

## 2020-01-26 RX ORDER — ALPRAZOLAM 0.5 MG/1
0.5 TABLET ORAL 3 TIMES DAILY PRN
Status: DISCONTINUED | OUTPATIENT
Start: 2020-01-26 | End: 2020-01-28 | Stop reason: HOSPADM

## 2020-01-26 RX ORDER — SODIUM CHLORIDE 9 MG/ML
75 INJECTION, SOLUTION INTRAVENOUS CONTINUOUS
Status: DISCONTINUED | OUTPATIENT
Start: 2020-01-26 | End: 2020-01-27

## 2020-01-26 RX ORDER — ACETAMINOPHEN 325 MG/1
650 TABLET ORAL EVERY 4 HOURS PRN
Status: DISCONTINUED | OUTPATIENT
Start: 2020-01-26 | End: 2020-01-28 | Stop reason: HOSPADM

## 2020-01-26 RX ORDER — CARVEDILOL 25 MG/1
25 TABLET ORAL 2 TIMES DAILY WITH MEALS
COMMUNITY
End: 2020-01-28 | Stop reason: HOSPADM

## 2020-01-26 RX ORDER — ACETAMINOPHEN 160 MG/5ML
650 SOLUTION ORAL EVERY 4 HOURS PRN
Status: DISCONTINUED | OUTPATIENT
Start: 2020-01-26 | End: 2020-01-28 | Stop reason: HOSPADM

## 2020-01-26 RX ORDER — NITROGLYCERIN 0.4 MG/1
0.4 TABLET SUBLINGUAL
Status: DISCONTINUED | OUTPATIENT
Start: 2020-01-26 | End: 2020-01-28 | Stop reason: HOSPADM

## 2020-01-26 RX ORDER — ACETAMINOPHEN 650 MG/1
650 SUPPOSITORY RECTAL EVERY 4 HOURS PRN
Status: DISCONTINUED | OUTPATIENT
Start: 2020-01-26 | End: 2020-01-28 | Stop reason: HOSPADM

## 2020-01-26 RX ORDER — LISINOPRIL 5 MG/1
5 TABLET ORAL DAILY
Status: DISCONTINUED | OUTPATIENT
Start: 2020-01-27 | End: 2020-01-27

## 2020-01-26 RX ORDER — ONDANSETRON 2 MG/ML
4 INJECTION INTRAMUSCULAR; INTRAVENOUS EVERY 6 HOURS PRN
Status: DISCONTINUED | OUTPATIENT
Start: 2020-01-26 | End: 2020-01-28 | Stop reason: HOSPADM

## 2020-01-26 RX ORDER — SODIUM CHLORIDE 0.9 % (FLUSH) 0.9 %
10 SYRINGE (ML) INJECTION AS NEEDED
Status: DISCONTINUED | OUTPATIENT
Start: 2020-01-26 | End: 2020-01-28 | Stop reason: HOSPADM

## 2020-01-26 RX ORDER — FUROSEMIDE 40 MG/1
40 TABLET ORAL DAILY
Status: DISCONTINUED | OUTPATIENT
Start: 2020-01-27 | End: 2020-01-28 | Stop reason: HOSPADM

## 2020-01-26 RX ORDER — SODIUM CHLORIDE 0.9 % (FLUSH) 0.9 %
10 SYRINGE (ML) INJECTION EVERY 12 HOURS SCHEDULED
Status: DISCONTINUED | OUTPATIENT
Start: 2020-01-26 | End: 2020-01-28 | Stop reason: HOSPADM

## 2020-01-26 RX ORDER — POTASSIUM CHLORIDE 750 MG/1
10 TABLET, EXTENDED RELEASE ORAL DAILY
COMMUNITY

## 2020-01-26 RX ADMIN — ALPRAZOLAM 0.5 MG: 0.5 TABLET ORAL at 22:51

## 2020-01-26 RX ADMIN — SODIUM CHLORIDE 75 ML/HR: 9 INJECTION, SOLUTION INTRAVENOUS at 21:56

## 2020-01-26 RX ADMIN — CARVEDILOL 25 MG: 25 TABLET, FILM COATED ORAL at 21:56

## 2020-01-27 LAB
ALBUMIN SERPL-MCNC: 3.7 G/DL (ref 3.5–5.2)
ALBUMIN/GLOB SERPL: 1.3 G/DL
ALP SERPL-CCNC: 74 U/L (ref 39–117)
ALT SERPL W P-5'-P-CCNC: <5 U/L (ref 1–33)
ANION GAP SERPL CALCULATED.3IONS-SCNC: 15.6 MMOL/L (ref 5–15)
AST SERPL-CCNC: 13 U/L (ref 1–32)
BASOPHILS # BLD AUTO: 0.02 10*3/MM3 (ref 0–0.2)
BASOPHILS NFR BLD AUTO: 0.4 % (ref 0–1.5)
BILIRUB SERPL-MCNC: 0.5 MG/DL (ref 0.2–1.2)
BUN BLD-MCNC: 37 MG/DL (ref 8–23)
BUN/CREAT SERPL: 28.5 (ref 7–25)
CALCIUM SPEC-SCNC: 8.9 MG/DL (ref 8.6–10.5)
CHLORIDE SERPL-SCNC: 82 MMOL/L (ref 98–107)
CO2 SERPL-SCNC: 35.4 MMOL/L (ref 22–29)
CREAT BLD-MCNC: 1.3 MG/DL (ref 0.57–1)
DEPRECATED RDW RBC AUTO: 42.2 FL (ref 37–54)
EOSINOPHIL # BLD AUTO: 0.13 10*3/MM3 (ref 0–0.4)
EOSINOPHIL NFR BLD AUTO: 2.3 % (ref 0.3–6.2)
ERYTHROCYTE [DISTWIDTH] IN BLOOD BY AUTOMATED COUNT: 12.1 % (ref 12.3–15.4)
GFR SERPL CREATININE-BSD FRML MDRD: 39 ML/MIN/1.73
GLOBULIN UR ELPH-MCNC: 2.9 GM/DL
GLUCOSE BLD-MCNC: 74 MG/DL (ref 65–99)
HCT VFR BLD AUTO: 31.3 % (ref 34–46.6)
HGB BLD-MCNC: 10.3 G/DL (ref 12–15.9)
IMM GRANULOCYTES # BLD AUTO: 0.01 10*3/MM3 (ref 0–0.05)
IMM GRANULOCYTES NFR BLD AUTO: 0.2 % (ref 0–0.5)
LYMPHOCYTES # BLD AUTO: 0.69 10*3/MM3 (ref 0.7–3.1)
LYMPHOCYTES NFR BLD AUTO: 12.3 % (ref 19.6–45.3)
MCH RBC QN AUTO: 30.8 PG (ref 26.6–33)
MCHC RBC AUTO-ENTMCNC: 32.9 G/DL (ref 31.5–35.7)
MCV RBC AUTO: 93.7 FL (ref 79–97)
MONOCYTES # BLD AUTO: 0.48 10*3/MM3 (ref 0.1–0.9)
MONOCYTES NFR BLD AUTO: 8.6 % (ref 5–12)
NEUTROPHILS # BLD AUTO: 4.28 10*3/MM3 (ref 1.7–7)
NEUTROPHILS NFR BLD AUTO: 76.2 % (ref 42.7–76)
NRBC BLD AUTO-RTO: 0 /100 WBC (ref 0–0.2)
PLATELET # BLD AUTO: 152 10*3/MM3 (ref 140–450)
PMV BLD AUTO: 9.7 FL (ref 6–12)
POTASSIUM BLD-SCNC: 4 MMOL/L (ref 3.5–5.2)
PROT SERPL-MCNC: 6.6 G/DL (ref 6–8.5)
RBC # BLD AUTO: 3.34 10*6/MM3 (ref 3.77–5.28)
SODIUM BLD-SCNC: 133 MMOL/L (ref 136–145)
WBC NRBC COR # BLD: 5.61 10*3/MM3 (ref 3.4–10.8)

## 2020-01-27 PROCEDURE — 97162 PT EVAL MOD COMPLEX 30 MIN: CPT

## 2020-01-27 PROCEDURE — 94799 UNLISTED PULMONARY SVC/PX: CPT

## 2020-01-27 PROCEDURE — 94640 AIRWAY INHALATION TREATMENT: CPT

## 2020-01-27 PROCEDURE — 85025 COMPLETE CBC W/AUTO DIFF WBC: CPT | Performed by: INTERNAL MEDICINE

## 2020-01-27 PROCEDURE — 97110 THERAPEUTIC EXERCISES: CPT

## 2020-01-27 PROCEDURE — 80053 COMPREHEN METABOLIC PANEL: CPT | Performed by: INTERNAL MEDICINE

## 2020-01-27 PROCEDURE — 99222 1ST HOSP IP/OBS MODERATE 55: CPT | Performed by: INTERNAL MEDICINE

## 2020-01-27 RX ORDER — IPRATROPIUM BROMIDE AND ALBUTEROL SULFATE 2.5; .5 MG/3ML; MG/3ML
3 SOLUTION RESPIRATORY (INHALATION)
Status: DISCONTINUED | OUTPATIENT
Start: 2020-01-27 | End: 2020-01-28 | Stop reason: HOSPADM

## 2020-01-27 RX ADMIN — ALPRAZOLAM 0.5 MG: 0.5 TABLET ORAL at 17:51

## 2020-01-27 RX ADMIN — CARVEDILOL 25 MG: 25 TABLET, FILM COATED ORAL at 10:09

## 2020-01-27 RX ADMIN — CARVEDILOL 25 MG: 25 TABLET, FILM COATED ORAL at 17:49

## 2020-01-27 RX ADMIN — IPRATROPIUM BROMIDE AND ALBUTEROL SULFATE 3 ML: 2.5; .5 SOLUTION RESPIRATORY (INHALATION) at 16:11

## 2020-01-27 RX ADMIN — POTASSIUM CHLORIDE 10 MEQ: 750 CAPSULE, EXTENDED RELEASE ORAL at 10:09

## 2020-01-27 RX ADMIN — SODIUM CHLORIDE, PRESERVATIVE FREE 10 ML: 5 INJECTION INTRAVENOUS at 10:10

## 2020-01-27 RX ADMIN — FUROSEMIDE 40 MG: 40 TABLET ORAL at 10:09

## 2020-01-27 RX ADMIN — SODIUM CHLORIDE, PRESERVATIVE FREE 10 ML: 5 INJECTION INTRAVENOUS at 22:42

## 2020-01-27 RX ADMIN — ALPRAZOLAM 0.5 MG: 0.5 TABLET ORAL at 10:10

## 2020-01-28 VITALS
RESPIRATION RATE: 18 BRPM | OXYGEN SATURATION: 97 % | BODY MASS INDEX: 19.05 KG/M2 | TEMPERATURE: 97.9 F | SYSTOLIC BLOOD PRESSURE: 117 MMHG | HEIGHT: 68 IN | DIASTOLIC BLOOD PRESSURE: 45 MMHG | WEIGHT: 125.7 LBS | HEART RATE: 73 BPM

## 2020-01-28 PROCEDURE — 97110 THERAPEUTIC EXERCISES: CPT

## 2020-01-28 PROCEDURE — 94799 UNLISTED PULMONARY SVC/PX: CPT

## 2020-01-28 PROCEDURE — 99232 SBSQ HOSP IP/OBS MODERATE 35: CPT | Performed by: INTERNAL MEDICINE

## 2020-01-28 PROCEDURE — 97116 GAIT TRAINING THERAPY: CPT

## 2020-01-28 RX ORDER — ALPRAZOLAM 0.5 MG/1
0.5 TABLET ORAL 3 TIMES DAILY PRN
Qty: 5 TABLET | Refills: 0 | Status: ON HOLD | OUTPATIENT
Start: 2020-01-28 | End: 2020-03-06 | Stop reason: SDUPTHER

## 2020-01-28 RX ORDER — IPRATROPIUM BROMIDE AND ALBUTEROL SULFATE 2.5; .5 MG/3ML; MG/3ML
3 SOLUTION RESPIRATORY (INHALATION)
Qty: 360 ML | Refills: 0 | Status: SHIPPED | OUTPATIENT
Start: 2020-01-28 | End: 2020-02-04 | Stop reason: SDUPTHER

## 2020-01-28 RX ORDER — CARVEDILOL 6.25 MG/1
6.25 TABLET ORAL 2 TIMES DAILY WITH MEALS
Status: DISCONTINUED | OUTPATIENT
Start: 2020-01-28 | End: 2020-01-28

## 2020-01-28 RX ORDER — CARVEDILOL 12.5 MG/1
12.5 TABLET ORAL 2 TIMES DAILY WITH MEALS
Status: DISCONTINUED | OUTPATIENT
Start: 2020-01-28 | End: 2020-01-28 | Stop reason: HOSPADM

## 2020-01-28 RX ORDER — CARVEDILOL 12.5 MG/1
12.5 TABLET ORAL 2 TIMES DAILY WITH MEALS
Qty: 60 TABLET | Refills: 0 | Status: SHIPPED | OUTPATIENT
Start: 2020-01-28 | End: 2020-01-30 | Stop reason: SDUPTHER

## 2020-01-28 RX ADMIN — ALPRAZOLAM 0.5 MG: 0.5 TABLET ORAL at 09:57

## 2020-01-28 RX ADMIN — SODIUM CHLORIDE, PRESERVATIVE FREE 10 ML: 5 INJECTION INTRAVENOUS at 08:45

## 2020-01-28 RX ADMIN — FUROSEMIDE 40 MG: 40 TABLET ORAL at 08:44

## 2020-01-28 RX ADMIN — POTASSIUM CHLORIDE 10 MEQ: 750 CAPSULE, EXTENDED RELEASE ORAL at 08:44

## 2020-01-28 RX ADMIN — IPRATROPIUM BROMIDE AND ALBUTEROL SULFATE 3 ML: 2.5; .5 SOLUTION RESPIRATORY (INHALATION) at 15:26

## 2020-01-30 RX ORDER — FUROSEMIDE 40 MG/1
40 TABLET ORAL DAILY
Qty: 30 TABLET | Refills: 5 | Status: SHIPPED | OUTPATIENT
Start: 2020-01-30

## 2020-01-30 RX ORDER — CARVEDILOL 12.5 MG/1
12.5 TABLET ORAL 2 TIMES DAILY WITH MEALS
Qty: 60 TABLET | Refills: 2 | Status: SHIPPED | OUTPATIENT
Start: 2020-01-30

## 2020-02-04 RX ORDER — IPRATROPIUM BROMIDE AND ALBUTEROL SULFATE 2.5; .5 MG/3ML; MG/3ML
3 SOLUTION RESPIRATORY (INHALATION)
Qty: 360 ML | Refills: 0 | Status: SHIPPED | OUTPATIENT
Start: 2020-02-04

## 2020-03-03 ENCOUNTER — APPOINTMENT (OUTPATIENT)
Dept: GENERAL RADIOLOGY | Facility: HOSPITAL | Age: 85
End: 2020-03-03

## 2020-03-03 ENCOUNTER — HOSPITAL ENCOUNTER (INPATIENT)
Facility: HOSPITAL | Age: 85
LOS: 3 days | Discharge: NURSING, REHAB OR LONG-TERM CARE | End: 2020-03-06
Attending: EMERGENCY MEDICINE | Admitting: INTERNAL MEDICINE

## 2020-03-03 DIAGNOSIS — D64.9 CHRONIC ANEMIA: ICD-10-CM

## 2020-03-03 DIAGNOSIS — I50.9 ACUTE ON CHRONIC CONGESTIVE HEART FAILURE, UNSPECIFIED HEART FAILURE TYPE (HCC): ICD-10-CM

## 2020-03-03 DIAGNOSIS — N18.9 CHRONIC KIDNEY DISEASE, UNSPECIFIED CKD STAGE: ICD-10-CM

## 2020-03-03 DIAGNOSIS — J44.1 ACUTE EXACERBATION OF CHRONIC OBSTRUCTIVE PULMONARY DISEASE (COPD) (HCC): Primary | ICD-10-CM

## 2020-03-03 DIAGNOSIS — I50.33 ACUTE ON CHRONIC DIASTOLIC CHF (CONGESTIVE HEART FAILURE) (HCC): ICD-10-CM

## 2020-03-03 PROBLEM — Z99.81 OXYGEN DEPENDENT: Status: ACTIVE | Noted: 2020-03-03

## 2020-03-03 LAB
ALBUMIN SERPL-MCNC: 3.9 G/DL (ref 3.5–5.2)
ALBUMIN/GLOB SERPL: 1.1 G/DL
ALP SERPL-CCNC: 108 U/L (ref 39–117)
ALT SERPL W P-5'-P-CCNC: 7 U/L (ref 1–33)
ANION GAP SERPL CALCULATED.3IONS-SCNC: 9.9 MMOL/L (ref 5–15)
APTT PPP: 27.5 SECONDS (ref 22.7–35.4)
AST SERPL-CCNC: 13 U/L (ref 1–32)
B PARAPERT DNA SPEC QL NAA+PROBE: NOT DETECTED
B PERT DNA SPEC QL NAA+PROBE: NOT DETECTED
BASOPHILS # BLD AUTO: 0.04 10*3/MM3 (ref 0–0.2)
BASOPHILS NFR BLD AUTO: 0.5 % (ref 0–1.5)
BILIRUB SERPL-MCNC: 0.5 MG/DL (ref 0.2–1.2)
BUN BLD-MCNC: 29 MG/DL (ref 8–23)
BUN/CREAT SERPL: 19.9 (ref 7–25)
C PNEUM DNA NPH QL NAA+NON-PROBE: NOT DETECTED
CALCIUM SPEC-SCNC: 8.9 MG/DL (ref 8.6–10.5)
CHLORIDE SERPL-SCNC: 91 MMOL/L (ref 98–107)
CO2 SERPL-SCNC: 40.1 MMOL/L (ref 22–29)
CREAT BLD-MCNC: 1.46 MG/DL (ref 0.57–1)
DEPRECATED RDW RBC AUTO: 41.7 FL (ref 37–54)
EOSINOPHIL # BLD AUTO: 0.18 10*3/MM3 (ref 0–0.4)
EOSINOPHIL NFR BLD AUTO: 2.2 % (ref 0.3–6.2)
ERYTHROCYTE [DISTWIDTH] IN BLOOD BY AUTOMATED COUNT: 11.8 % (ref 12.3–15.4)
FLUAV H1 2009 PAND RNA NPH QL NAA+PROBE: NOT DETECTED
FLUAV H1 HA GENE NPH QL NAA+PROBE: NOT DETECTED
FLUAV H3 RNA NPH QL NAA+PROBE: NOT DETECTED
FLUAV SUBTYP SPEC NAA+PROBE: NOT DETECTED
FLUBV RNA ISLT QL NAA+PROBE: NOT DETECTED
GFR SERPL CREATININE-BSD FRML MDRD: 34 ML/MIN/1.73
GLOBULIN UR ELPH-MCNC: 3.4 GM/DL
GLUCOSE BLD-MCNC: 184 MG/DL (ref 65–99)
HADV DNA SPEC NAA+PROBE: NOT DETECTED
HCOV 229E RNA SPEC QL NAA+PROBE: NOT DETECTED
HCOV HKU1 RNA SPEC QL NAA+PROBE: NOT DETECTED
HCOV NL63 RNA SPEC QL NAA+PROBE: NOT DETECTED
HCOV OC43 RNA SPEC QL NAA+PROBE: NOT DETECTED
HCT VFR BLD AUTO: 34 % (ref 34–46.6)
HGB BLD-MCNC: 10.6 G/DL (ref 12–15.9)
HMPV RNA NPH QL NAA+NON-PROBE: NOT DETECTED
HPIV1 RNA SPEC QL NAA+PROBE: NOT DETECTED
HPIV2 RNA SPEC QL NAA+PROBE: NOT DETECTED
HPIV3 RNA NPH QL NAA+PROBE: NOT DETECTED
HPIV4 P GENE NPH QL NAA+PROBE: NOT DETECTED
IMM GRANULOCYTES # BLD AUTO: 0.04 10*3/MM3 (ref 0–0.05)
IMM GRANULOCYTES NFR BLD AUTO: 0.5 % (ref 0–0.5)
INR PPP: 1.02 (ref 0.9–1.1)
LYMPHOCYTES # BLD AUTO: 0.8 10*3/MM3 (ref 0.7–3.1)
LYMPHOCYTES NFR BLD AUTO: 9.8 % (ref 19.6–45.3)
M PNEUMO IGG SER IA-ACNC: NOT DETECTED
MAGNESIUM SERPL-MCNC: 2.1 MG/DL (ref 1.6–2.4)
MCH RBC QN AUTO: 29.6 PG (ref 26.6–33)
MCHC RBC AUTO-ENTMCNC: 31.2 G/DL (ref 31.5–35.7)
MCV RBC AUTO: 95 FL (ref 79–97)
MONOCYTES # BLD AUTO: 0.57 10*3/MM3 (ref 0.1–0.9)
MONOCYTES NFR BLD AUTO: 7 % (ref 5–12)
NEUTROPHILS # BLD AUTO: 6.55 10*3/MM3 (ref 1.7–7)
NEUTROPHILS NFR BLD AUTO: 80 % (ref 42.7–76)
NRBC BLD AUTO-RTO: 0 /100 WBC (ref 0–0.2)
NT-PROBNP SERPL-MCNC: 7267 PG/ML (ref 5–1800)
PLATELET # BLD AUTO: 221 10*3/MM3 (ref 140–450)
PMV BLD AUTO: 10.3 FL (ref 6–12)
POTASSIUM BLD-SCNC: 4.3 MMOL/L (ref 3.5–5.2)
PROT SERPL-MCNC: 7.3 G/DL (ref 6–8.5)
PROTHROMBIN TIME: 13.1 SECONDS (ref 11.7–14.2)
RBC # BLD AUTO: 3.58 10*6/MM3 (ref 3.77–5.28)
RHINOVIRUS RNA SPEC NAA+PROBE: NOT DETECTED
RSV RNA NPH QL NAA+NON-PROBE: NOT DETECTED
SODIUM BLD-SCNC: 141 MMOL/L (ref 136–145)
T4 FREE SERPL-MCNC: 1.06 NG/DL (ref 0.93–1.7)
TROPONIN T SERPL-MCNC: <0.01 NG/ML (ref 0–0.03)
TSH SERPL DL<=0.05 MIU/L-ACNC: 4.13 UIU/ML (ref 0.27–4.2)
WBC NRBC COR # BLD: 8.18 10*3/MM3 (ref 3.4–10.8)

## 2020-03-03 PROCEDURE — 84439 ASSAY OF FREE THYROXINE: CPT | Performed by: EMERGENCY MEDICINE

## 2020-03-03 PROCEDURE — 83880 ASSAY OF NATRIURETIC PEPTIDE: CPT | Performed by: EMERGENCY MEDICINE

## 2020-03-03 PROCEDURE — 85730 THROMBOPLASTIN TIME PARTIAL: CPT | Performed by: EMERGENCY MEDICINE

## 2020-03-03 PROCEDURE — 71045 X-RAY EXAM CHEST 1 VIEW: CPT

## 2020-03-03 PROCEDURE — 93010 ELECTROCARDIOGRAM REPORT: CPT | Performed by: INTERNAL MEDICINE

## 2020-03-03 PROCEDURE — 84484 ASSAY OF TROPONIN QUANT: CPT | Performed by: EMERGENCY MEDICINE

## 2020-03-03 PROCEDURE — 25010000002 ENOXAPARIN PER 10 MG: Performed by: INTERNAL MEDICINE

## 2020-03-03 PROCEDURE — 80053 COMPREHEN METABOLIC PANEL: CPT | Performed by: EMERGENCY MEDICINE

## 2020-03-03 PROCEDURE — 85610 PROTHROMBIN TIME: CPT | Performed by: EMERGENCY MEDICINE

## 2020-03-03 PROCEDURE — 94799 UNLISTED PULMONARY SVC/PX: CPT

## 2020-03-03 PROCEDURE — 85025 COMPLETE CBC W/AUTO DIFF WBC: CPT | Performed by: EMERGENCY MEDICINE

## 2020-03-03 PROCEDURE — 0100U HC BIOFIRE FILMARRAY RESP PANEL 2: CPT | Performed by: INTERNAL MEDICINE

## 2020-03-03 PROCEDURE — 25010000002 FUROSEMIDE PER 20 MG: Performed by: INTERNAL MEDICINE

## 2020-03-03 PROCEDURE — 84443 ASSAY THYROID STIM HORMONE: CPT | Performed by: EMERGENCY MEDICINE

## 2020-03-03 PROCEDURE — 25010000002 CALCIUM GLUCONATE PER 10 ML: Performed by: EMERGENCY MEDICINE

## 2020-03-03 PROCEDURE — 25010000002 MAGNESIUM SULFATE 2 GM/50ML SOLUTION: Performed by: EMERGENCY MEDICINE

## 2020-03-03 PROCEDURE — 93005 ELECTROCARDIOGRAM TRACING: CPT | Performed by: EMERGENCY MEDICINE

## 2020-03-03 PROCEDURE — 83735 ASSAY OF MAGNESIUM: CPT | Performed by: EMERGENCY MEDICINE

## 2020-03-03 PROCEDURE — 25010000002 FUROSEMIDE PER 20 MG: Performed by: EMERGENCY MEDICINE

## 2020-03-03 PROCEDURE — 25010000002 METHYLPREDNISOLONE PER 125 MG: Performed by: INTERNAL MEDICINE

## 2020-03-03 PROCEDURE — 99285 EMERGENCY DEPT VISIT HI MDM: CPT

## 2020-03-03 RX ORDER — MAGNESIUM SULFATE HEPTAHYDRATE 40 MG/ML
2 INJECTION, SOLUTION INTRAVENOUS ONCE
Status: COMPLETED | OUTPATIENT
Start: 2020-03-03 | End: 2020-03-03

## 2020-03-03 RX ORDER — DILTIAZEM HYDROCHLORIDE 60 MG/1
60 TABLET, FILM COATED ORAL ONCE
Status: COMPLETED | OUTPATIENT
Start: 2020-03-03 | End: 2020-03-03

## 2020-03-03 RX ORDER — NITROGLYCERIN 0.4 MG/1
0.4 TABLET SUBLINGUAL
Status: DISCONTINUED | OUTPATIENT
Start: 2020-03-03 | End: 2020-03-06 | Stop reason: HOSPADM

## 2020-03-03 RX ORDER — FUROSEMIDE 10 MG/ML
40 INJECTION INTRAMUSCULAR; INTRAVENOUS
Status: DISCONTINUED | OUTPATIENT
Start: 2020-03-03 | End: 2020-03-04

## 2020-03-03 RX ORDER — METHYLPREDNISOLONE SODIUM SUCCINATE 125 MG/2ML
60 INJECTION, POWDER, LYOPHILIZED, FOR SOLUTION INTRAMUSCULAR; INTRAVENOUS EVERY 8 HOURS
Status: DISCONTINUED | OUTPATIENT
Start: 2020-03-03 | End: 2020-03-03

## 2020-03-03 RX ORDER — ACETAMINOPHEN 650 MG/1
650 SUPPOSITORY RECTAL EVERY 4 HOURS PRN
Status: DISCONTINUED | OUTPATIENT
Start: 2020-03-03 | End: 2020-03-06 | Stop reason: HOSPADM

## 2020-03-03 RX ORDER — ACETAMINOPHEN 160 MG/5ML
650 SOLUTION ORAL EVERY 4 HOURS PRN
Status: DISCONTINUED | OUTPATIENT
Start: 2020-03-03 | End: 2020-03-06 | Stop reason: HOSPADM

## 2020-03-03 RX ORDER — SODIUM CHLORIDE 0.9 % (FLUSH) 0.9 %
10 SYRINGE (ML) INJECTION AS NEEDED
Status: DISCONTINUED | OUTPATIENT
Start: 2020-03-03 | End: 2020-03-06 | Stop reason: HOSPADM

## 2020-03-03 RX ORDER — ONDANSETRON 2 MG/ML
4 INJECTION INTRAMUSCULAR; INTRAVENOUS EVERY 6 HOURS PRN
Status: DISCONTINUED | OUTPATIENT
Start: 2020-03-03 | End: 2020-03-06 | Stop reason: HOSPADM

## 2020-03-03 RX ORDER — ALPRAZOLAM 0.5 MG/1
0.5 TABLET ORAL 3 TIMES DAILY PRN
Status: DISCONTINUED | OUTPATIENT
Start: 2020-03-03 | End: 2020-03-06 | Stop reason: HOSPADM

## 2020-03-03 RX ORDER — IPRATROPIUM BROMIDE AND ALBUTEROL SULFATE 2.5; .5 MG/3ML; MG/3ML
3 SOLUTION RESPIRATORY (INHALATION)
Status: DISCONTINUED | OUTPATIENT
Start: 2020-03-03 | End: 2020-03-06 | Stop reason: HOSPADM

## 2020-03-03 RX ORDER — ALBUTEROL SULFATE 2.5 MG/3ML
2.5 SOLUTION RESPIRATORY (INHALATION) ONCE
Status: COMPLETED | OUTPATIENT
Start: 2020-03-03 | End: 2020-03-03

## 2020-03-03 RX ORDER — CALCIUM CHLORIDE 100 MG/ML
2 INJECTION INTRAVENOUS; INTRAVENTRICULAR ONCE
Status: DISCONTINUED | OUTPATIENT
Start: 2020-03-03 | End: 2020-03-03

## 2020-03-03 RX ORDER — ALBUTEROL SULFATE 2.5 MG/3ML
2.5 SOLUTION RESPIRATORY (INHALATION)
Status: DISCONTINUED | OUTPATIENT
Start: 2020-03-03 | End: 2020-03-03

## 2020-03-03 RX ORDER — FUROSEMIDE 10 MG/ML
40 INJECTION INTRAMUSCULAR; INTRAVENOUS ONCE
Status: COMPLETED | OUTPATIENT
Start: 2020-03-03 | End: 2020-03-03

## 2020-03-03 RX ORDER — SODIUM CHLORIDE 0.9 % (FLUSH) 0.9 %
10 SYRINGE (ML) INJECTION EVERY 12 HOURS SCHEDULED
Status: DISCONTINUED | OUTPATIENT
Start: 2020-03-03 | End: 2020-03-06 | Stop reason: HOSPADM

## 2020-03-03 RX ORDER — METHYLPREDNISOLONE SODIUM SUCCINATE 40 MG/ML
40 INJECTION, POWDER, LYOPHILIZED, FOR SOLUTION INTRAMUSCULAR; INTRAVENOUS EVERY 8 HOURS
Status: DISCONTINUED | OUTPATIENT
Start: 2020-03-04 | End: 2020-03-04

## 2020-03-03 RX ORDER — ACETAMINOPHEN 325 MG/1
650 TABLET ORAL EVERY 4 HOURS PRN
Status: DISCONTINUED | OUTPATIENT
Start: 2020-03-03 | End: 2020-03-06 | Stop reason: HOSPADM

## 2020-03-03 RX ORDER — IPRATROPIUM BROMIDE AND ALBUTEROL SULFATE 2.5; .5 MG/3ML; MG/3ML
3 SOLUTION RESPIRATORY (INHALATION) ONCE
Status: COMPLETED | OUTPATIENT
Start: 2020-03-03 | End: 2020-03-03

## 2020-03-03 RX ORDER — CARVEDILOL 12.5 MG/1
12.5 TABLET ORAL 2 TIMES DAILY WITH MEALS
Status: DISCONTINUED | OUTPATIENT
Start: 2020-03-03 | End: 2020-03-06 | Stop reason: HOSPADM

## 2020-03-03 RX ORDER — DILTIAZEM HYDROCHLORIDE 60 MG/1
60 TABLET, FILM COATED ORAL EVERY 8 HOURS SCHEDULED
Status: DISCONTINUED | OUTPATIENT
Start: 2020-03-03 | End: 2020-03-06 | Stop reason: HOSPADM

## 2020-03-03 RX ADMIN — FUROSEMIDE 40 MG: 10 INJECTION, SOLUTION INTRAMUSCULAR; INTRAVENOUS at 18:23

## 2020-03-03 RX ADMIN — FUROSEMIDE 40 MG: 10 INJECTION, SOLUTION INTRAMUSCULAR; INTRAVENOUS at 12:13

## 2020-03-03 RX ADMIN — MAGNESIUM SULFATE 2 G: 2 INJECTION INTRAVENOUS at 12:15

## 2020-03-03 RX ADMIN — ENOXAPARIN SODIUM 30 MG: 30 INJECTION SUBCUTANEOUS at 18:23

## 2020-03-03 RX ADMIN — ALPRAZOLAM 0.5 MG: 0.5 TABLET ORAL at 21:16

## 2020-03-03 RX ADMIN — DILTIAZEM HYDROCHLORIDE 60 MG: 60 TABLET, FILM COATED ORAL at 21:13

## 2020-03-03 RX ADMIN — CARVEDILOL 12.5 MG: 12.5 TABLET, FILM COATED ORAL at 18:31

## 2020-03-03 RX ADMIN — CALCIUM GLUCONATE 2 G: 98 INJECTION, SOLUTION INTRAVENOUS at 11:48

## 2020-03-03 RX ADMIN — SODIUM CHLORIDE, PRESERVATIVE FREE 10 ML: 5 INJECTION INTRAVENOUS at 21:14

## 2020-03-03 RX ADMIN — IPRATROPIUM BROMIDE AND ALBUTEROL SULFATE 3 ML: 2.5; .5 SOLUTION RESPIRATORY (INHALATION) at 19:28

## 2020-03-03 RX ADMIN — METHYLPREDNISOLONE SODIUM SUCCINATE 60 MG: 125 INJECTION, POWDER, FOR SOLUTION INTRAMUSCULAR; INTRAVENOUS at 18:23

## 2020-03-03 RX ADMIN — DILTIAZEM HYDROCHLORIDE 60 MG: 60 TABLET, FILM COATED ORAL at 14:57

## 2020-03-03 RX ADMIN — ALBUTEROL SULFATE 2.5 MG: 2.5 SOLUTION RESPIRATORY (INHALATION) at 13:14

## 2020-03-03 RX ADMIN — IPRATROPIUM BROMIDE AND ALBUTEROL SULFATE 3 ML: 2.5; .5 SOLUTION RESPIRATORY (INHALATION) at 12:47

## 2020-03-03 NOTE — ED PROVIDER NOTES
EMERGENCY DEPARTMENT ENCOUNTER    Room Number:  15/15  Date of encounter:  3/3/2020  PCP: Migdalia Espitia MD  Historian: Patient, patient's son, EMS    HPI:  Chief Complaint: Dyspnea  Context: Lucy Duane is a 85 y.o. female who presents to the ED c/o dyspnea.  Patient's son states his mother called this morning it was extremely short of breath which caused him to immediately go over there.  He found her in respiratory distress and immediately called 911.  This occurred at approximately 930 this morning.  Upon EMS arrival, they found the patient with A. fib RVR as well as diminished breath sounds with diffuse wheezing.  Patient was given 125 of Solu-Medrol and single DuoNeb.  Patient's heart rate was initially in the 160s, normotensive.  Patient received 12 mg IV diltiazem.  After IV diltiazem, heart rate dramatically improved but patient became hypotensive with reported blood pressure of 70/50.  No other treatments prior to arrival.  Patient states she has been feeling short of breath for approximately 1 week, initially just with exertion, now at rest.  Patient states dyspnea is worse when laying back, no paroxysmal nocturnal dyspnea.  Patient denies any swelling of abdomen or legs, no expected weight gain, no cough, no fever shakes chills or night sweats, no chest pain.  Patient son states patient has had 2 similar episodes in the last several months.      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) 2020   • CKD (chronic kidney disease) 2020   • Aortic aneurysm, abdominal (CMS/HCC) 2020     Resolved Ambulatory Problems     Diagnosis Date Noted   • No Resolved Ambulatory Problems     Past Medical History:   Diagnosis Date   • Compromised renal function    • Oxygen dependent          PAST SURGICAL HISTORY  Past Surgical History:   Procedure Laterality Date   • BREAST SURGERY      Lumpectomy and abscess removal   • EYE SURGERY           FAMILY HISTORY  Family History   Problem Relation Age of Onset   • Rheumatic fever Mother          SOCIAL HISTORY  Social History     Socioeconomic History   • Marital status: Unknown     Spouse name: Not on file   • Number of children: Not on file   • Years of education: Not on file   • Highest education level: Not on file   Tobacco Use   • Smoking status: Former Smoker     Packs/day: 1.50     Years: 60.00     Pack years: 90.00     Types: Cigarettes     Last attempt to quit: 3/31/2008     Years since quittin.9   • Smokeless tobacco: Never Used   Substance and Sexual Activity   • Alcohol use: No   • Drug use: No     Comment: + CAFFEINE   Social History Narrative    Lives at home alone - son checks on her daily         ALLERGIES  Patient has no known allergies.    The patient's allergies have been reviewed    REVIEW OF SYSTEMS  All systems reviewed and negative except for those discussed in HPI.       PHYSICAL EXAM  I have reviewed the triage vital signs and nursing notes.    ED Triage Vitals [20 1122]   Temp Heart Rate Resp BP SpO2   -- 90 -- (!) 70/52 (!) 89 %      Temp src Heart Rate Source Patient Position BP Location FiO2 (%)   -- -- -- -- --       Physical Exam   Constitutional: She is oriented to person, place, and time. She appears well-developed and well-nourished. She appears distressed.   HENT:   Head: Normocephalic and atraumatic.   Eyes: Pupils are equal, round,  and reactive to light. EOM are normal.   Neck: Normal range of motion. Neck supple.   Cardiovascular: Normal rate, regular rhythm, normal heart sounds and intact distal pulses.   Pulmonary/Chest: Accessory muscle usage present. Tachypnea noted. She is in respiratory distress.   Slightly diminished, crackles bilateral lungs lower two thirds   Abdominal: Soft. There is no tenderness. There is no rebound and no guarding.   Abdominal distention   Musculoskeletal: Normal range of motion. She exhibits no edema or deformity.   Neurological: She is alert and oriented to person, place, and time.   Skin: Skin is warm and dry.   No peripheral edema.   Nursing note and vitals reviewed.          LAB RESULTS  Recent Results (from the past 24 hour(s))   Comprehensive Metabolic Panel    Collection Time: 03/03/20 11:40 AM   Result Value Ref Range    Glucose 184 (H) 65 - 99 mg/dL    BUN 29 (H) 8 - 23 mg/dL    Creatinine 1.46 (H) 0.57 - 1.00 mg/dL    Sodium 141 136 - 145 mmol/L    Potassium 4.3 3.5 - 5.2 mmol/L    Chloride 91 (L) 98 - 107 mmol/L    CO2 40.1 (H) 22.0 - 29.0 mmol/L    Calcium 8.9 8.6 - 10.5 mg/dL    Total Protein 7.3 6.0 - 8.5 g/dL    Albumin 3.90 3.50 - 5.20 g/dL    ALT (SGPT) 7 1 - 33 U/L    AST (SGOT) 13 1 - 32 U/L    Alkaline Phosphatase 108 39 - 117 U/L    Total Bilirubin 0.5 0.2 - 1.2 mg/dL    eGFR Non African Amer 34 (L) >60 mL/min/1.73    Globulin 3.4 gm/dL    A/G Ratio 1.1 g/dL    BUN/Creatinine Ratio 19.9 7.0 - 25.0    Anion Gap 9.9 5.0 - 15.0 mmol/L   Protime-INR    Collection Time: 03/03/20 11:40 AM   Result Value Ref Range    Protime 13.1 11.7 - 14.2 Seconds    INR 1.02 0.90 - 1.10   aPTT    Collection Time: 03/03/20 11:40 AM   Result Value Ref Range    PTT 27.5 22.7 - 35.4 seconds   Troponin    Collection Time: 03/03/20 11:40 AM   Result Value Ref Range    Troponin T <0.010 0.000 - 0.030 ng/mL   BNP    Collection Time: 03/03/20 11:40 AM   Result Value Ref Range    proBNP 7,267.0 (H) 5.0-1,800.0 pg/mL    Magnesium    Collection Time: 03/03/20 11:40 AM   Result Value Ref Range    Magnesium 2.1 1.6 - 2.4 mg/dL   T4, Free    Collection Time: 03/03/20 11:40 AM   Result Value Ref Range    Free T4 1.06 0.93 - 1.70 ng/dL   TSH    Collection Time: 03/03/20 11:40 AM   Result Value Ref Range    TSH 4.130 0.270 - 4.200 uIU/mL   CBC Auto Differential    Collection Time: 03/03/20 11:40 AM   Result Value Ref Range    WBC 8.18 3.40 - 10.80 10*3/mm3    RBC 3.58 (L) 3.77 - 5.28 10*6/mm3    Hemoglobin 10.6 (L) 12.0 - 15.9 g/dL    Hematocrit 34.0 34.0 - 46.6 %    MCV 95.0 79.0 - 97.0 fL    MCH 29.6 26.6 - 33.0 pg    MCHC 31.2 (L) 31.5 - 35.7 g/dL    RDW 11.8 (L) 12.3 - 15.4 %    RDW-SD 41.7 37.0 - 54.0 fl    MPV 10.3 6.0 - 12.0 fL    Platelets 221 140 - 450 10*3/mm3    Neutrophil % 80.0 (H) 42.7 - 76.0 %    Lymphocyte % 9.8 (L) 19.6 - 45.3 %    Monocyte % 7.0 5.0 - 12.0 %    Eosinophil % 2.2 0.3 - 6.2 %    Basophil % 0.5 0.0 - 1.5 %    Immature Grans % 0.5 0.0 - 0.5 %    Neutrophils, Absolute 6.55 1.70 - 7.00 10*3/mm3    Lymphocytes, Absolute 0.80 0.70 - 3.10 10*3/mm3    Monocytes, Absolute 0.57 0.10 - 0.90 10*3/mm3    Eosinophils, Absolute 0.18 0.00 - 0.40 10*3/mm3    Basophils, Absolute 0.04 0.00 - 0.20 10*3/mm3    Immature Grans, Absolute 0.04 0.00 - 0.05 10*3/mm3    nRBC 0.0 0.0 - 0.2 /100 WBC       I ordered the above labs and reviewed the results.      RADIOLOGY  Xr Chest 1 View    Result Date: 3/3/2020  ONE VIEW PORTABLE CHEST  HISTORY: Shortness of breath.  FINDINGS: There is cardiomegaly with considerable vascular congestion and some interstitial prominence occurring since study of 01/26/2020 and most consistent with changes of mild to moderate congestive heart failure. There is an associated small left pleural effusion. The patient has underlying COPD with hyperinflation that is unchanged.  This report was finalized on 3/3/2020 1:44 PM by Dr. Westley Mullins M.D.        I ordered the above noted radiological studies. I  reviewed the images and results. I agree with the radiologist interpretation.      PROCEDURES  Procedures      MEDICATIONS GIVEN IN ER  Medications   sodium chloride 0.9 % bolus 500 mL (0 mL Intravenous Hold 3/3/20 1148)   calcium gluconate 1 g/100 mL (10 mg/mL) NS IVPB (VTB) (0 g Intravenous Stopped 3/3/20 1445)   magnesium sulfate 2g/50 mL (PREMIX) infusion (0 g Intravenous Stopped 3/3/20 1445)   furosemide (LASIX) injection 40 mg (40 mg Intravenous Given 3/3/20 1213)   ipratropium-albuterol (DUO-NEB) nebulizer solution 3 mL (3 mL Nebulization Given 3/3/20 1247)   albuterol (PROVENTIL) nebulizer solution 0.083% 2.5 mg/3mL (2.5 mg Nebulization Given 3/3/20 1314)   dilTIAZem (CARDIZEM) tablet 60 mg (60 mg Oral Given 3/3/20 1457)         PROGRESS, DATA ANALYSIS, CONSULTS, AND MEDICAL DECISION MAKING  A complete history and physical exam have been performed.  All available laboratory and imaging results have been reviewed by myself prior to disposition.  [facility]      ED Course as of Mar 03 1545   Tue Mar 03, 2020   1128 Discussed pertinent information from history and physical exam with patient.  Discussed differential diagnosis.  Discussed plan for ED evaluation/work-up/treatment.  All questions answered.  Patient is agreeable with plan.        [JG]   1130 Patient has history of A. fib, initially RVR per EMS.  RVR has since resolved, patient presents with continued hypotension.  Given recent diltiazem, empirically treating with calcium, 500 cc IV fluids.  Patient satting 89% on nasal cannula at 2 L.  Patient in respiratory distress, accessory muscle use, tachypneic.  Started on BiPAP.    [JG]   1142 Patient started on BiPAP, currently normotensive to mild hypertension.  IV fluids discontinued.  Will closely monitor.    [JG]   1153 EKG independently viewed and contemporaneously interpreted by ED physician. Time: 11:48 AM.  Rate 105.  Atrial fibrillation, RVR has resolved, normal axis, LVH with secondary repull  abnormality, borderline prolonged QTC.  No significant change from prior 1/26/20.    [JG]   1235 Patient refusing BiPAP, respiratory status improving.  Patient reassessed: Diminished, does now have expiratory wheezing, continues with crackles at bases.  Ordering breathing treatments.    [JG]   1245 Chest x-ray reviewed in PACS.  Per my read cephalization of pulmonary vessels with pulmonary edema, left pleural effusion, probable small right pleural effusion    [JG]   1251 Work-up significant for: Anemia, chronic, at baseline.  Chronic kidney disease, creatinine at baseline.  Mild elevation of glucose, no DKA.  BNP is significantly elevated, troponin negative.  EKG did not show ischemic changes.  Chest x-ray shows pulmonary edema.  Patient being diuresed.  Patient also having wheezing, likely COPD exacerbation as well.  Patient status post steroids, magnesium, received breathing treatments both with EMS and with us.  Plan for admission to the hospitalist.    [JG]   1330 Patient reassessed status post breathing treatment.  Patient satting well on home O2 via nasal cannula, no respiratory distress.  Patient able speak in full sentences.  Repeat lung exam: Improved air movement, slight end expiratory wheeze, continues to have crackles at bases.    [JG]   1430 Patient reassessed, patient comfortable, breathing without difficulty.  Currently pending hospitalist callback for admission.    [JG]   1514 Patient reassessed.  Discussed ED findings, differential diagnosis, and the need for admission.  They are agreeable to admission and all questions were answered.        [JG]   1528 Phone call with Dr Ireland.  Discussed the patient, relevant history, exam, diagnostics, ED findings/progress, and concerns. They agree to admit the patient to telemetry. Care assumed by the admitting physician at this time.        [JG]      ED Course User Index  [JG] Rosalio Colin MD       AS OF 3:42 PM VITALS:    BP - 148/75  HR - (!) 125  TEMP  - 96.8 °F (36 °C) (Tympanic)  O2 SATS - 96%    Critical care:  Total critical care time of 50 minutes is exclusive of any other billable procedures and includes time spent with direct patient care and observation, retrospective chart review, management of acute condition, and consultation with other physicians.      DIAGNOSIS  Final diagnoses:   Acute exacerbation of chronic obstructive pulmonary disease (COPD) (CMS/[facility])   Acute on chronic congestive heart failure, unspecified heart failure type (CMS/[facility])   Chronic kidney disease, unspecified CKD stage   Chronic anemia         DISPOSITION  ADMISSION    Discussed treatment plan and reason for admission with pt/family and admitting physician.  Pt/family voiced understanding of the plan for admission for further testing/treatment as needed.            Rosalio Colin MD  03/03/20 7503

## 2020-03-03 NOTE — ED NOTES
Pt presents to ED via EMS with complains of SOA. When EMS arrived they found pt to be in a-fib with RVR. Pt was given Cardizem, duoneb, and 125 soul medrol. Pt currently hypotensive and has decreased oxygen level at this time.      Degonda, Janet, RN  03/03/20 0737

## 2020-03-03 NOTE — H&P
Internal medicine history and physical  INTERNAL MEDICINE   [facility]       Patient Identification:  Name: Lucy Duane  Age: 85 y.o.  Sex: female  :  1934  MRN: [id number]                   Primary Care Physician: Migdalia Espitia MD                                   Chief Complaint: Patient was sent to the emergency room via EMS for atrial fibrillation with rapid ventricular response significant hypoxia and altered mental status aggressively getting worse since  night.    History of Present Illness:   Patient is a 85-year-old female who has complicated past medical history including atrial fibrillation congestive heart failure chronic kidney disease COPD with chronic hypoxia on home oxygen hospitalized for 2 days for acute on chronic diastolic congestive heart failure with COPD exacerbation in the setting of chronic kidney disease.  Patient was diuresed and during her hospitalization she was noted that her heart rate drops down in 40s when she is sleeping.  This resulted in decreasing his Coreg from 25 mg twice a day to 12.5 mg twice daily.  According to the discharge summary patient heart rhythm was in sinus throughout the hospitalization.  Patient was sent to subacute rehab and then subsequently she went home.  According to the patient and patient's son patient started having progressive shortness of breath and decreased exercise capacity but a week ago and by   evening her shortness of breath got worse and has been essentially bedbound and not moving much since then until early morning today when she attempted to call her son but could not communicate and all her son could hear was somebody gasping for air.  Patient recognized the caller ID knew that his mom was calling calling him.  He drove and saw his mom laying on the couch listless and not responsive and barely able to communicate in severe distress.  EMS was called and patient was noted to be in atrial fibrillation with  rapid ventricular response and hypoxic.  In route patient was given DuoNeb and 12 mg of IV Cardizem.  This caused her blood pressure to be dropped 70/50.  Patient recalls that her heart rate was rapid and it could be due to her Coreg been decreased too abruptly.  Wearing chills denies any significant congestion.  In the emergency room patient received some oral Cardizem and was given BiPAP for short duration.  Patient refused ABG but her chemistry shows bicarb of 40.  Patient remained fairly awake and interactive and much more with it after the BiPAP treatment.  Patient is being admitted for further care.    Past Medical History:  Past Medical History:   Diagnosis Date   • Atrial fibrillation (CMS/HCC)    • CHF (congestive heart failure) (CMS/HCC)    • Compromised renal function    • COPD (chronic obstructive pulmonary disease) (CMS/HCC)    • Oxygen dependent     2L at all times     Past Surgical History:  Past Surgical History:   Procedure Laterality Date   • BREAST SURGERY      Lumpectomy and abscess removal   • EYE SURGERY        Home Meds:    (Not in a hospital admission)  Current Meds:     Current Facility-Administered Medications:   •  sodium chloride 0.9 % bolus 500 mL, 500 mL, Intravenous, Once, Rosalio Colin MD, Stopped at 03/03/20 3722    Current Outpatient Medications:   •  ALPRAZolam (XANAX) 0.5 MG tablet, Take 1 tablet by mouth 3 (Three) Times a Day As Needed for Anxiety., Disp: 5 tablet, Rfl: 0  •  carvedilol (COREG) 12.5 MG tablet, Take 1 tablet by mouth 2 (Two) Times a Day With Meals., Disp: 60 tablet, Rfl: 2  •  furosemide (LASIX) 40 MG tablet, Take 1 tablet by mouth Daily., Disp: 30 tablet, Rfl: 5  •  ipratropium-albuterol (DUO-NEB) 0.5-2.5 mg/3 ml nebulizer, Take 3 mL by nebulization 3 (Three) Times a Day., Disp: 360 mL, Rfl: 0  •  potassium chloride (K-DUR) 10 MEQ CR tablet, , Disp: , Rfl:   •  enoxaparin (LOVENOX) 30 MG/0.3ML solution syringe, Inject 0.3 mL under the skin into the  "appropriate area as directed Daily. Indications: Prevention of Unwanted Clot in Veins, Disp: 1 mL, Rfl:   •  potassium chloride (K-DUR,KLOR-CON) 10 MEQ CR tablet, Take 10 mEq by mouth Daily., Disp: , Rfl:   Allergies:  No Known Allergies  Social History:   Social History     Tobacco Use   • Smoking status: Former Smoker     Packs/day: 1.50     Years: 60.00     Pack years: 90.00     Types: Cigarettes     Last attempt to quit: 3/31/2008     Years since quittin.9   • Smokeless tobacco: Never Used   Substance Use Topics   • Alcohol use: No      Family History:  Family History   Problem Relation Age of Onset   • Rheumatic fever Mother           Review of Systems  See history of present illness and past medical history.    Constitutional: Remarkable for no fever or chills but does have generalized weakness.  Cardiovascular: Remarkable for shortness of breath and rapid heart rate no significant swelling of her legs but does have orthopnea.    Respiratory: Remarkable for cough congestion and worsening shortness of breath but no hemoptysis  GI: Remarkable for preserved appetite no nausea or vomiting  : Remarkable for no burning in urination frequency or urgency  Musculoskeletal: Positive for no specific joint aches and pain  Neurological: Remarkable for loss of consciousness or continence.  Vitals:   /81   Pulse 104   Temp 96.8 °F (36 °C) (Tympanic)   Resp 18   Ht 175.3 cm (69\")   Wt 61.2 kg (135 lb)   LMP  (LMP Unknown)   SpO2 97%   BMI 19.94 kg/m²   I/O:     Intake/Output Summary (Last 24 hours) at 3/3/2020 1608  Last data filed at 3/3/2020 1456  Gross per 24 hour   Intake --   Output 300 ml   Net -300 ml     Exam:  General Appearance:   Awake pleasant interactive and does not appear to be in any acute distress.   Head:    Normocephalic, without obvious abnormality, atraumatic   Eyes:    PERRL, conjunctiva/corneas clear, EOM's intact, both eyes   Ears:    Normal external ear canals, both ears   Nose:   " Nares normal, septum midline, mucosa normal, no drainage    or sinus tenderness   Throat:   Lips, tongue, gums normal; oral mucosa pink and moist   Neck:   Supple, symmetrical, trachea midline, no adenopathy;     thyroid:  no enlargement/tenderness/nodules; no carotid    bruit or JVD   Back:     Symmetric, no curvature, ROM normal, no CVA tenderness   Lungs:    Decreased breath sounds at the bases   Chest Wall:    No tenderness or deformity    Heart:   Regularly irregular and tachycardia   Abdomen:    Soft nontender no organomegaly detected bowel sounds are positive.   Extremities:  No significant edema   Pulses:   Pulses palpable in all extremities; symmetric all extremities   Skin:   Skin color normal, Skin is warm and dry,  no rashes or palpable lesions   Neurologic:  Grossly nonfocal       Data Review:      I reviewed the patient's new clinical results.  Results from last 7 days   Lab Units 03/03/20  1140   WBC 10*3/mm3 8.18   HEMOGLOBIN g/dL 10.6*   PLATELETS 10*3/mm3 221     Results from last 7 days   Lab Units 03/03/20  1140   SODIUM mmol/L 141   POTASSIUM mmol/L 4.3   CHLORIDE mmol/L 91*   CO2 mmol/L 40.1*   BUN mg/dL 29*   CREATININE mg/dL 1.46*   CALCIUM mg/dL 8.9   GLUCOSE mg/dL 184*     ECG 12 Lead   Preliminary Result   HEART RATE= 105  bpm   RR Interval= 570  ms   ND Interval= 177  ms   P Horizontal Axis= 220  deg   P Front Axis= -86  deg   QRSD Interval= 96  ms   QT Interval= 371  ms   QRS Axis= 8  deg   T Wave Axis= 91  deg   - ABNORMAL ECG -   Sinus tachycardia with irregular rate   LVH with secondary repolarization abnormality   Borderline prolonged QT interval   Electronically Signed By:    Date and Time of Study: 2020-03-03 11:48:04          Assessment:  Active Hospital Problems    Diagnosis POA   • **Acute exacerbation of chronic obstructive pulmonary disease (COPD) (CMS/[facility]) [J44.1] Yes   • Oxygen dependent [Z99.81] Not Applicable     2L at all times     • CKD (chronic kidney disease) [N18.9]  Yes   • Acute on chronic diastolic CHF (congestive heart failure) (CMS/HCC) [I50.33] Yes   • Hypoxia [R09.02] Yes   • Atrial fibrillation (CMS/HCC) [I48.91] Yes   • HTN (hypertension) [I10] Yes       Medical decision making:  Progressive shortness of breath with atrial fibrillation rapid ventricular response with symptom improving as heart rate is controlled-this likely represent acute diastolic congestive heart failure due to atrial fibrillation with rapid rate ventricular response.  Plan is to control the heart rate Coreg and add Cardizem diurese him and cardiology consultation.  Continue her subcu Lovenox.  Cardiology consultation.  Atrial fibrillation with rapid ventricular response-with Coreg and Cardizem cardiology consultation check serial troponin  COPD with possible exacerbation provided with nebulizer treatment no threshold to check ABG if she becomes somnolent to rule out hypercapnic failure.  Pulmonary consultation.  Chronic kidney disease-Monitor renal function and avoid nephrotoxic agent and hypotensive episodes.  Hypertension-continue antihypertensive regimen and avoid hypotensive episode.  Bernadine Ireland MD   3/3/2020  4:08 PM  Much of this encounter note is an electronic transcription/translation of spoken language to printed text. The electronic translation of spoken language may permit erroneous, or at times, nonsensical words or phrases to be inadvertently transcribed; Although I have reviewed the note for such errors, some may still exist

## 2020-03-03 NOTE — CONSULTS
"           CONSULT NOTE    Patient Identification:  Lucy Duane  85 y.o.  female  1934  [id number]            Requesting physician:  Dr Ireland    Reason for Consultation:  abnromal abg hypercapnia, copd    CC: soa weakness    History of Present Illness:  Patient is an 85-year-old female with a previous medical history of congestive heart failure a CKD COPD chronic hypoxic respiratory failure former smoker of about 90 pack years quitting in 2008 who presented to the emergency room with increased shortness of breath generalized weakness.  She was found to be hypotensive and in A. fib with RVR.  Originally she was hypotensive got IV fluids and had improvement in her blood pressure.  She was given diltiazem IV Solu-Medrol as well as a DuoNeb treatment.  Patient stated that she had had orthopnea denied any chills myalgias fevers chest pain or hemoptysis.  Of note patient was started on BiPAP however ABG looks actually show some alkalosis and some chronic hypercapnia  Chest x-ray reviewed personally does show vascular congestion small effusion pulmonary edema.    She states she doesn't think anything is wrong with her lungs.  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  She carries on good conversation without any conversational dyspnea.  She says she has been on oxygen for at least 3 years.  She believes she follows with Dr. Kraus but cant remember completely.  She deneis chills fever hemoptysis sputum production.  Says her nose always runs, nothing new.    Review of Systems:  CONSTITUTIONAL:  Denies fevers or chills  EYE:  No new vision changes  EAR:  No change in hearing  CARDIAC:  No chest pain positive for orthopnea  PULMONARY:  No productive cough positive shortness of breath  GI:  No diarrhea, hematemesis or hematochezia,  RENAL:  No dysuria or urinary frequency  MUSCULOSKELETAL: Generalized " weakness  ENDOCRINE:  No heat or cold intolerance  INTEGUMENTARY: No skin rashes  NEUROLOGICAL:  No dizziness or confusion.  No seizure activity  PSYCHIATRIC:  No new anxiety or depression  12 system review of systems performed and all else negative    Past Medical History:   Diagnosis Date   • Atrial fibrillation (CMS/[facility])    • CHF (congestive heart failure) (CMS/[facility])    • Compromised renal function    • COPD (chronic obstructive pulmonary disease) (CMS/[facility])    • Oxygen dependent     2L at all times       Past Surgical History:   Procedure Laterality Date   • BREAST SURGERY      Lumpectomy and abscess removal   • EYE SURGERY          Medications Prior to Admission   Medication Sig Dispense Refill Last Dose   • ALPRAZolam (XANAX) 0.5 MG tablet Take 1 tablet by mouth 3 (Three) Times a Day As Needed for Anxiety. 5 tablet 0 3/2/2020 at Unknown time   • carvedilol (COREG) 12.5 MG tablet Take 1 tablet by mouth 2 (Two) Times a Day With Meals. 60 tablet 2 3/2/2020 at Unknown time   • furosemide (LASIX) 40 MG tablet Take 1 tablet by mouth Daily. 30 tablet 5 3/2/2020 at Unknown time   • ipratropium-albuterol (DUO-NEB) 0.5-2.5 mg/3 ml nebulizer Take 3 mL by nebulization 3 (Three) Times a Day. 360 mL 0 3/2/2020 at Unknown time   • potassium chloride (K-DUR) 10 MEQ CR tablet    3/2/2020 at Unknown time   • enoxaparin (LOVENOX) 30 MG/0.3ML solution syringe Inject 0.3 mL under the skin into the appropriate area as directed Daily. Indications: Prevention of Unwanted Clot in Veins 1 mL  Unknown at Unknown time   • potassium chloride (K-DUR,KLOR-CON) 10 MEQ CR tablet Take 10 mEq by mouth Daily.   1/26/2020 at Unknown time       No Known Allergies    Social History     Socioeconomic History   • Marital status: Unknown     Spouse name: Not on file   • Number of children: Not on file   • Years of education: Not on file   • Highest education level: Not on file   Tobacco Use   • Smoking status: Former Smoker     Packs/day: 1.50      "Years: 60.00     Pack years: 90.00     Types: Cigarettes     Last attempt to quit: 3/31/2008     Years since quittin.9   • Smokeless tobacco: Never Used   Substance and Sexual Activity   • Alcohol use: No   • Drug use: No     Comment: + CAFFEINE   Social History Narrative    Lives at home alone - son checks on her daily       Family History   Problem Relation Age of Onset   • Rheumatic fever Mother        Physical Exam:  /81 (BP Location: Left arm, Patient Position: Lying)   Pulse 93   Temp 98 °F (36.7 °C) (Oral)   Resp 16   Ht 175.3 cm (69\")   Wt 61.2 kg (135 lb)   LMP  (LMP Unknown)   SpO2 96%   BMI 19.94 kg/m²   Body mass index is 19.94 kg/m².   General appearance: non toxic, conversant   Eyes: anicteric sclerae, moist conjunctivae; no lid-lag; PERRLA  HENT: Atraumatic; oropharynx clear with moist mucous membranes and no mucosal ulcerations; normal hard and soft palate  Neck: Trachea midline; FROM, supple, no thyromegaly or lymphadenopathy  Lungs: symmetric air entry without wheeze some mild crackles no rhonchi, with normal respiratory effort and no intercostal retractions  CV: irreg irreg tachy no rub   Abdomen: Soft, non-tender; no masses or HSM  Extremities: No sig peripheral edema or extremity lymphadenopathy  Skin: Normal temperature, turgor and texture; no rash, ulcers or subcutaneous nodules  Psych: Appropriate affect, alert and oriented to person, place and time    LABS:  Results from last 7 days   Lab Units 20  1140   WBC 10*3/mm3 8.18   HEMOGLOBIN g/dL 10.6*   PLATELETS 10*3/mm3 221     Results from last 7 days   Lab Units 20  1140   SODIUM mmol/L 141   POTASSIUM mmol/L 4.3   CHLORIDE mmol/L 91*   CO2 mmol/L 40.1*   BUN mg/dL 29*   CREATININE mg/dL 1.46*   GLUCOSE mg/dL 184*   CALCIUM mg/dL 8.9   MAGNESIUM mg/dL 2.1   Estimated Creatinine Clearance: 27.2 mL/min (A) (by C-G formula based on SCr of 1.46 mg/dL (H)).    Imaging: I personally visualized the images of " scans/x-rays performed within last 3 days.  Imaging Results (Most Recent)     Procedure Component Value Units Date/Time    XR Chest 1 View [[id number]] Collected:  03/03/20 1309     Updated:  03/03/20 1347    Narrative:       ONE VIEW PORTABLE CHEST     HISTORY: Shortness of breath.     FINDINGS: There is cardiomegaly with considerable vascular congestion  and some interstitial prominence occurring since study of 01/26/2020 and  most consistent with changes of mild to moderate congestive heart  failure. There is an associated small left pleural effusion. The patient  has underlying COPD with hyperinflation that is unchanged.     This report was finalized on 3/3/2020 1:44 PM by Dr. Westley Mullins M.D.             Assessment / Recommendations:  Chronic hypoxic resp failure  Chronic hypercapnic resp failure  afib rvr  COPD ae    Patient Active Problem List   Diagnosis   • Edema   • HTN (hypertension)   • Agoraphobia   • Orthopnea   • Hyponatremia   • Atrial fibrillation (CMS/HCC)   • Cellulitis of foot   • Proteinuria   • Hypoxia   • Pulmonary nodules   • Pancreatic cyst   • CHF (congestive heart failure) (CMS/HCC)   • Acute on chronic diastolic CHF (congestive heart failure) (CMS/HCC)   • COPD (chronic obstructive pulmonary disease) (CMS/HCC)   • CKD (chronic kidney disease)   • Aortic aneurysm, abdominal (CMS/HCC)   • Acute exacerbation of chronic obstructive pulmonary disease (COPD) (CMS/HCC)   • Oxygen dependent     Doesnt appear to be in acute exacerbation at present, seems like it may be more related to afib rvr but really hard to tell that now pt has received iv solumedrol 125 and nebs in er.    For now would continue on duonebs  Ill decrease anderson of IV solumedrol to 40 q8 and likely wean further to po tomorrow  rvp  Defer to primary if cards needed for afib rvr      Mathieu Mendez MD  [facility]  03/03/20  713PM

## 2020-03-04 LAB
ALBUMIN SERPL-MCNC: 3.6 G/DL (ref 3.5–5.2)
ALBUMIN/GLOB SERPL: 1.2 G/DL
ALP SERPL-CCNC: 77 U/L (ref 39–117)
ALT SERPL W P-5'-P-CCNC: 5 U/L (ref 1–33)
ANION GAP SERPL CALCULATED.3IONS-SCNC: 13.1 MMOL/L (ref 5–15)
AST SERPL-CCNC: 14 U/L (ref 1–32)
BASOPHILS # BLD AUTO: 0 10*3/MM3 (ref 0–0.2)
BASOPHILS NFR BLD AUTO: 0 % (ref 0–1.5)
BILIRUB SERPL-MCNC: 0.4 MG/DL (ref 0.2–1.2)
BUN BLD-MCNC: 29 MG/DL (ref 8–23)
BUN/CREAT SERPL: 22 (ref 7–25)
CALCIUM SPEC-SCNC: 9.1 MG/DL (ref 8.6–10.5)
CHLORIDE SERPL-SCNC: 91 MMOL/L (ref 98–107)
CO2 SERPL-SCNC: 36.9 MMOL/L (ref 22–29)
CREAT BLD-MCNC: 1.32 MG/DL (ref 0.57–1)
DEPRECATED RDW RBC AUTO: 42.6 FL (ref 37–54)
EOSINOPHIL # BLD AUTO: 0 10*3/MM3 (ref 0–0.4)
EOSINOPHIL NFR BLD AUTO: 0 % (ref 0.3–6.2)
ERYTHROCYTE [DISTWIDTH] IN BLOOD BY AUTOMATED COUNT: 12.1 % (ref 12.3–15.4)
GFR SERPL CREATININE-BSD FRML MDRD: 38 ML/MIN/1.73
GLOBULIN UR ELPH-MCNC: 3.1 GM/DL
GLUCOSE BLD-MCNC: 119 MG/DL (ref 65–99)
HCT VFR BLD AUTO: 30.5 % (ref 34–46.6)
HGB BLD-MCNC: 9.8 G/DL (ref 12–15.9)
IMM GRANULOCYTES # BLD AUTO: 0.04 10*3/MM3 (ref 0–0.05)
IMM GRANULOCYTES NFR BLD AUTO: 0.8 % (ref 0–0.5)
LYMPHOCYTES # BLD AUTO: 0.34 10*3/MM3 (ref 0.7–3.1)
LYMPHOCYTES NFR BLD AUTO: 7.1 % (ref 19.6–45.3)
MCH RBC QN AUTO: 30.4 PG (ref 26.6–33)
MCHC RBC AUTO-ENTMCNC: 32.1 G/DL (ref 31.5–35.7)
MCV RBC AUTO: 94.7 FL (ref 79–97)
MONOCYTES # BLD AUTO: 0.1 10*3/MM3 (ref 0.1–0.9)
MONOCYTES NFR BLD AUTO: 2.1 % (ref 5–12)
NEUTROPHILS # BLD AUTO: 4.34 10*3/MM3 (ref 1.7–7)
NEUTROPHILS NFR BLD AUTO: 90 % (ref 42.7–76)
NRBC BLD AUTO-RTO: 0 /100 WBC (ref 0–0.2)
PLATELET # BLD AUTO: 150 10*3/MM3 (ref 140–450)
PMV BLD AUTO: 10.3 FL (ref 6–12)
POTASSIUM BLD-SCNC: 3.7 MMOL/L (ref 3.5–5.2)
PROT SERPL-MCNC: 6.7 G/DL (ref 6–8.5)
RBC # BLD AUTO: 3.22 10*6/MM3 (ref 3.77–5.28)
SODIUM BLD-SCNC: 141 MMOL/L (ref 136–145)
WBC NRBC COR # BLD: 4.82 10*3/MM3 (ref 3.4–10.8)

## 2020-03-04 PROCEDURE — 85025 COMPLETE CBC W/AUTO DIFF WBC: CPT | Performed by: INTERNAL MEDICINE

## 2020-03-04 PROCEDURE — 25010000002 ENOXAPARIN PER 10 MG: Performed by: INTERNAL MEDICINE

## 2020-03-04 PROCEDURE — 94799 UNLISTED PULMONARY SVC/PX: CPT

## 2020-03-04 PROCEDURE — 97161 PT EVAL LOW COMPLEX 20 MIN: CPT

## 2020-03-04 PROCEDURE — 63710000001 PREDNISONE PER 1 MG: Performed by: INTERNAL MEDICINE

## 2020-03-04 PROCEDURE — 97110 THERAPEUTIC EXERCISES: CPT

## 2020-03-04 PROCEDURE — 80053 COMPREHEN METABOLIC PANEL: CPT | Performed by: INTERNAL MEDICINE

## 2020-03-04 RX ORDER — PREDNISONE 20 MG/1
40 TABLET ORAL
Status: DISCONTINUED | OUTPATIENT
Start: 2020-03-04 | End: 2020-03-05

## 2020-03-04 RX ORDER — FUROSEMIDE 40 MG/1
40 TABLET ORAL DAILY
Status: DISCONTINUED | OUTPATIENT
Start: 2020-03-04 | End: 2020-03-06 | Stop reason: HOSPADM

## 2020-03-04 RX ADMIN — IPRATROPIUM BROMIDE AND ALBUTEROL SULFATE 3 ML: 2.5; .5 SOLUTION RESPIRATORY (INHALATION) at 07:11

## 2020-03-04 RX ADMIN — SODIUM CHLORIDE, PRESERVATIVE FREE 10 ML: 5 INJECTION INTRAVENOUS at 11:49

## 2020-03-04 RX ADMIN — IPRATROPIUM BROMIDE AND ALBUTEROL SULFATE 3 ML: 2.5; .5 SOLUTION RESPIRATORY (INHALATION) at 19:21

## 2020-03-04 RX ADMIN — ALPRAZOLAM 0.5 MG: 0.5 TABLET ORAL at 11:48

## 2020-03-04 RX ADMIN — SODIUM CHLORIDE, PRESERVATIVE FREE 10 ML: 5 INJECTION INTRAVENOUS at 21:05

## 2020-03-04 RX ADMIN — DILTIAZEM HYDROCHLORIDE 60 MG: 60 TABLET, FILM COATED ORAL at 21:04

## 2020-03-04 RX ADMIN — CARVEDILOL 12.5 MG: 12.5 TABLET, FILM COATED ORAL at 11:47

## 2020-03-04 RX ADMIN — IPRATROPIUM BROMIDE AND ALBUTEROL SULFATE 3 ML: 2.5; .5 SOLUTION RESPIRATORY (INHALATION) at 10:45

## 2020-03-04 RX ADMIN — CARVEDILOL 12.5 MG: 12.5 TABLET, FILM COATED ORAL at 17:21

## 2020-03-04 RX ADMIN — ALPRAZOLAM 0.5 MG: 0.5 TABLET ORAL at 21:58

## 2020-03-04 RX ADMIN — DILTIAZEM HYDROCHLORIDE 60 MG: 60 TABLET, FILM COATED ORAL at 06:10

## 2020-03-04 RX ADMIN — FUROSEMIDE 40 MG: 40 TABLET ORAL at 11:47

## 2020-03-04 RX ADMIN — DILTIAZEM HYDROCHLORIDE 60 MG: 60 TABLET, FILM COATED ORAL at 13:58

## 2020-03-04 RX ADMIN — ENOXAPARIN SODIUM 30 MG: 30 INJECTION SUBCUTANEOUS at 17:21

## 2020-03-04 RX ADMIN — IPRATROPIUM BROMIDE AND ALBUTEROL SULFATE 3 ML: 2.5; .5 SOLUTION RESPIRATORY (INHALATION) at 15:04

## 2020-03-04 NOTE — PROGRESS NOTES
Discharge Planning Assessment  [facility]     Patient Name: Lucy Duane  MRN: [id number]  Today's Date: 3/4/2020    Admit Date: 3/3/2020    Discharge Needs Assessment     Row Name 03/04/20 1228       Living Environment    Lives With  alone    Current Living Arrangements  home/apartment/condo    Primary Care Provided by  self    Provides Primary Care For  no one    Family Caregiver if Needed  child(noelle), adult    Family Caregiver Names  Son Dan Duane, [phone] (Healthcare surrogate on file)    Quality of Family Relationships  helpful;involved;supportive    Able to Return to Prior Arrangements  yes       Resource/Environmental Concerns    Resource/Environmental Concerns  home accessibility    Home Accessibility Concerns  stairs to access bedroom or bathroom       Transition Planning    Patient/Family Anticipates Transition to  inpatient rehabilitation facility    Patient/Family Anticipated Services at Transition  skilled nursing    Transportation Anticipated  family or friend will provide       Discharge Needs Assessment    Concerns to be Addressed  discharge planning    Equipment Currently Used at Home  cane, straight;oxygen    Outpatient/Agency/Support Group Needs  skilled nursing facility    Discharge Facility/Level of Care Needs  nursing facility, skilled    Discharge Coordination/Progress  SNF referrals pending        Discharge Plan     Row Name 03/04/20 1229       Plan    Plan  SNF referrals pending    Provided Post Acute Provider List?  Yes    Post Acute Provider List  Nursing Home    Provided Post Acute Provider Quality & Resource List?  Yes    Post Acute Provider Quality and Resource List  Nursing Home    Delivered To  Patient    Method of Delivery  In person    Patient/Family in Agreement with Plan  yes    Plan Comments  IMM letter checked. CCP met with pt to discuss d/c planning. Facesheet verified. CCP role explained. Pt resides alone in a two level home in which bathroom is accessed via steps to second  floor. Pt's son (Dan Duane, [phone]) assists as needed and is also healthcare surrogate on file. Pt uses cane, O2 via [employer], is reportedly current for [facility] home health services (awaiting confirmation), and has been to  sub-acute rehab at [facility]. Pt confirms pharmacy is [facility] [address]. Pt anticipates need for sub-acute rehab at d/c and requests referrals to [facility] (first choice) and [facility] (referrals in [facility]). [facility] provided CMS compare list for pt's [address] [place] [address] for any additional options. CCP to follow for PT eval and facility evals. Gricel Calhoun LCSW        Destination      Service Provider Request Status Selected Services Address Phone Number Fax Number    [facility] Pending - Request Sent N/A [address] [facility] [address] [phone]    [facility] Pending - Request Sent N/A [address] [facility] [id number] [phone]      Durable Medical Equipment      Coordination has not been started for this encounter.      Dialysis/Infusion      Coordination has not been started for this encounter.      Home Medical Care      Coordination has not been started for this encounter.      Therapy      Coordination has not been started for this encounter.      Community Resources      Coordination has not been started for this encounter.          Demographic Summary     Row Name 03/04/20 1227       General Information    Admission Type  inpatient    Arrived From  home    Required Notices Provided  Important Message from Medicare    Referral Source  admission list    Reason for Consult  discharge planning    Preferred Language  English        Functional Status     Row Name 03/04/20 1228       Functional Status    Usual Activity Tolerance  moderate    Current Activity Tolerance  moderate       Functional Status, IADL    Medications  independent    Meal Preparation  independent    Housekeeping  independent    Laundry  independent     Shopping  independent       Mental Status Summary    Recent Changes in Mental Status/Cognitive Functioning  no changes        Psychosocial    No documentation.       Abuse/Neglect    No documentation.       Legal    No documentation.       Substance Abuse    No documentation.       Patient Forms    No documentation.           Jessica Calhoun LCSW

## 2020-03-04 NOTE — PLAN OF CARE
"  Problem: Patient Care Overview  Goal: Plan of Care Review  Outcome: Ongoing (interventions implemented as appropriate)  Flowsheets  Taken 3/4/2020 0431  Progress: improving  Taken 3/4/2020 0005  Plan of Care Reviewed With: patient  Note:   VSS, remains in afib but with a controlled rate. No complaint of SOA, remains on 2lpm NC. Refused newly started prednisone, stating, \"Im much better than I was before I came in.\". Tolerating soft foods well, apple sauce and pudding and fluids. Will continue to monitor.      "

## 2020-03-04 NOTE — PLAN OF CARE
Pt. Arrived to unit this evening, came into ER c/o SOA, A-fib RVR.  Pt. Slightly tachycardic in the low 100s high 90s, much improved since meds given.  Pt. VS otherwise wnl.  No c/o pain.  Pt. A&O x4.  Pt. Receiving IV diuretics, adequate UOP.  Pt. Receiving duonebs and IV steroids.  Pt. Resting comfortably at present, will continue to monitor closely.    Problem: Patient Care Overview  Goal: Plan of Care Review  Outcome: Ongoing (interventions implemented as appropriate)  Flowsheets (Taken 3/3/2020 1923)  Progress: no change  Plan of Care Reviewed With: patient

## 2020-03-04 NOTE — THERAPY EVALUATION
Patient Name: Lucy Duane  : 1934    MRN: [id number]                              Today's Date: 3/4/2020       Admit Date: 3/3/2020    Visit Dx:     ICD-10-CM ICD-9-CM   1. Acute exacerbation of chronic obstructive pulmonary disease (COPD) (CMS/[facility]) J44.1 491.21   2. Acute on chronic congestive heart failure, unspecified heart failure type (CMS/[facility]) I50.9 428.0   3. Chronic kidney disease, unspecified CKD stage N18.9 585.9   4. Chronic anemia D64.9 285.9     Patient Active Problem List   Diagnosis   • Edema   • HTN (hypertension)   • Agoraphobia   • Orthopnea   • Hyponatremia   • Atrial fibrillation (CMS/[facility])   • Cellulitis of foot   • Proteinuria   • Hypoxia   • Pulmonary nodules   • Pancreatic cyst   • CHF (congestive heart failure) (CMS/[facility])   • Acute on chronic diastolic CHF (congestive heart failure) (CMS/[facility])   • COPD (chronic obstructive pulmonary disease) (CMS/[facility])   • CKD (chronic kidney disease)   • Aortic aneurysm, abdominal (CMS/[facility])   • Acute exacerbation of chronic obstructive pulmonary disease (COPD) (CMS/[facility])   • Oxygen dependent     Past Medical History:   Diagnosis Date   • Atrial fibrillation (CMS/[facility])    • CHF (congestive heart failure) (CMS/[facility])    • Compromised renal function    • COPD (chronic obstructive pulmonary disease) (CMS/[facility])    • Oxygen dependent     2L at all times     Past Surgical History:   Procedure Laterality Date   • BREAST SURGERY      Lumpectomy and abscess removal   • EYE SURGERY       General Information     Row Name 20 1552          PT Evaluation Time/Intention    Document Type  evaluation  -DJ     Mode of Treatment  individual therapy;physical therapy  -DJ     Row Name 20 1554          General Information    Patient Profile Reviewed?  yes  -DJ     Prior Level of Function  independent:;gait pt reports that she was having difficukty with bathing - couldnt negotiate stairs to/from bathroom  -DJ     Existing Precautions/Restrictions  fall;oxygen therapy  device and L/min 2L  -DJ     Barriers to Rehab  cognitive status;medically complex  -DJ     Row Name 03/04/20 1552          Relationship/Environment    Lives With  alone  -DJ     Row Name 03/04/20 1552          Resource/Environmental Concerns    Current Living Arrangements  home/apartment/condo  -DJ     Row Name 03/04/20 1552          Stairs Within Home, Primary    Stairs, Within Home, Primary  at least 1 flight  -DJ     Row Name 03/04/20 1552          Cognitive Assessment/Intervention- PT/OT    Orientation Status (Cognition)  oriented x 3  -DJ     Cognitive Assessment/Intervention Comment  Very pleasant nad cooperative, in good spirits but occasionally confused  -DJ     Row Name 03/04/20 1552          Safety Issues, Functional Mobility    Safety Issues Affecting Function (Mobility)  awareness of need for assistance  -DJ     Impairments Affecting Function (Mobility)  balance;endurance/activity tolerance;strength  -DJ     Comment, Safety Issues/Impairments (Mobility)  was using a cane PLOF  -DJ       User Key  (r) = Recorded By, (t) = Taken By, (c) = Cosigned By    Initials Name Provider Type    Nicole Hurtado, PT Physical Therapist        Mobility     Row Name 03/04/20 1555          Bed Mobility Assessment/Treatment    Bed Mobility Assessment/Treatment  sit-supine  -DJ     Row Name 03/04/20 1555          Transfer Assessment/Treatment    Comment (Transfers)  sit/stand from recliner chair and low commode  -DJ     Row Name 03/04/20 1555          Bed-Chair Transfer    Bed-Chair [place] (Transfers)  not tested  -DJ     Row Name 03/04/20 1555          Sit-Stand Transfer    Sit-Stand [place] (Transfers)  minimum assist (75% patient effort);verbal cues min A from commode; CGA from recliner  -DJ     Assistive Device (Sit-Stand Transfers)  walker, front-wheeled  -DJ     Row Name 03/04/20 1555          Gait/Stairs Assessment/Training    Gait/Stairs Assessment/Training  gait/ambulation assistive device  -DJ      [place] Level (Gait)  contact guard;verbal cues;1 person assist 2L O2  -DJ     Assistive Device (Gait)  walker, front-wheeled  -DJ     Distance in Feet (Gait)  110'  -DJ     Pattern (Gait)  step-through  -DJ     Deviations/Abnormal Patterns (Gait)  gait speed decreased;maria isabel decreased;base of support, narrow;stride length decreased  -DJ     Bilateral Gait Deviations  forward flexed posture  -DJ     [place] Level (Stairs)  not tested  -DJ     Comment (Gait/Stairs)  Slow pace and pt req 2 standing rest breaks due to SOB. Fair balance  -DJ       User Key  (r) = Recorded By, (t) = Taken By, (c) = Cosigned By    Initials Name Provider Type    DJ Nicole Marquez, PT Physical Therapist        Obj/Interventions     Row Name 03/04/20 1557          General ROM    GENERAL ROM COMMENTS  Grossly WFL  -DJ     Row Name 03/04/20 1557          MMT (Manual Muscle Testing)    General MMT Comments  Moves all 4s; generally deconditioned  -DJ     Row Name 03/04/20 1557          Therapeutic Exercise    Comment (Therapeutic Exercise)  HEP deferred today due to aide present to bathe  -DJ     Row Name 03/04/20 1557          Static Sitting Balance    Level of [place] (Unsupported Sitting, Static Balance)  supervision  -DJ     Sitting Position (Unsupported Sitting, Static Balance)  sitting on edge of bed  -DJ     Time Able to Maintain Position (Unsupported Sitting, Static Balance)  1 to 2 minutes  -DJ     Row Name 03/04/20 1557          Static Standing Balance    Level of [place] (Supported Standing, Static Balance)  contact guard assist  -DJ     Time Able to Maintain Position (Supported Standing, Static Balance)  1 to 2 minutes  -DJ     Row Name 03/04/20 1557          Dynamic Standing Balance    Level of [place], Reaches Outside Midline (Standing, Dynamic Balance)  contact guard assist  -DJ     Time Able to Maintain Position, Reaches Outside Midline (Standing, Dynamic Balance)  2 to 3 minutes  -DJ     Comment, Reaches  Outside Midline (Standing, Dynamic Balance)  Pt able to maintain standing balace while doffing brief as well as washing hands (and face) at sink  -DJ     Row Name 03/04/20 1557          Sensory Assessment/Intervention    Sensory General Assessment  no sensation deficits identified  -DJ       User Key  (r) = Recorded By, (t) = Taken By, (c) = Cosigned By    Initials Name Provider Type    Nicole Hurtado, PT Physical Therapist        Goals/Plan     Row Name 03/04/20 1605          Bed Mobility Goal 1 (PT)    Activity/Assistive Device (Bed Mobility Goal 1, PT)  sit to supine;supine to sit  -DJ     [place] Level/Cues Needed (Bed Mobility Goal 1, PT)  supervision required  -DJ     Time Frame (Bed Mobility Goal 1, PT)  1 week  -DJ     Row Name 03/04/20 1605          Transfer Goal 1 (PT)    Activity/Assistive Device (Transfer Goal 1, PT)  sit-to-stand/stand-to-sit;toilet  -DJ     [place] Level/Cues Needed (Transfer Goal 1, PT)  supervision required  -DJ     Time Frame (Transfer Goal 1, PT)  1 week  -DJ     Row Name 03/04/20 1605          Gait Training Goal 1 (PT)    Activity/Assistive Device (Gait Training Goal 1, PT)  gait (walking locomotion);assistive device use;increase endurance/gait distance;increase energy conservation  -DJ     [place] Level (Gait Training Goal 1, PT)  contact guard assist  -DJ     Distance (Gait Goal 1, PT)  >300'  -DJ     Time Frame (Gait Training Goal 1, PT)  1 week  -DJ     Barriers (Gait Training Goal 1, PT)  endurance  -DJ     Row Name 03/04/20 1605          Stairs Goal 1 (PT)    Activity/Assistive Device (Stairs Goal 1, PT)  ascending stairs;descending stairs  -DJ     [place] Level/Cues Needed (Stairs Goal 1, PT)  contact guard assist;verbal cues required  -DJ     Number of Stairs (Stairs Goal 1, PT)  5  -DJ     Time Frame (Stairs Goal 1, PT)  1 week  -DJ     Barriers (Stairs Goal 1, PT)  endurance  -DJ     Row Name 03/04/20 1605          Patient Education Goal (PT)     Activity (Patient Education Goal, PT)  HEP  -DJ     [place]/Cues/Accuracy (Memory Goal 2, PT)  demonstrates adequately;verbalizes understanding  -DJ     Time Frame (Patient Education Goal, PT)  1 week  -DJ       User Key  (r) = Recorded By, (t) = Taken By, (c) = Cosigned By    Initials Name Provider Type    Nicole Hurtado, PT Physical Therapist        Clinical Impression     Row Name 03/04/20 6564          Pain Assessment    Additional Documentation  Pain Scale: Numbers Pre/Post-Treatment (Group)  -DJ     Row Name 03/04/20 5059          Pain Scale: Numbers Pre/Post-Treatment    Pain Scale: Numbers, Pretreatment  0/10 - no pain  -DJ     Pain Scale: Numbers, Post-Treatment  0/10 - no pain  -DJ     Pre/Post Treatment Pain Comment  Pt denies pain. c/c is poor endurance. Pt suggests that she may need to go to a nursing home  -DJ     Row Name 03/04/20 3834          Plan of Care Review    Plan of Care Reviewed With  patient  -DJ     Outcome Summary  84 yo frail white female admitted 3/3/20 with acute exaccerbation COPD and SOA. PMH includes AAA, near syncope, htn, afib, and CKD. Today she is limited by generalized weakness, decreased endurance, and decreased balance - all affecting her functional mobility. She would like to return to her home but she has a lot of stairs and fears that she will have to go to a nursing home. She would certainly benefit from skilled PT for ther ex, gt/transfer training, bed mobility, balance, and endurance/activity tolerance as appropriate. She used a cane or 0 AD previously but I feel she would be safer using a r wx, quinn considering her fatigue and decreased endurance.  -DJ     Row Name 03/04/20 1555          Physical Therapy Clinical Impression    Patient/Family Goals Statement (PT Clinical Impression)  Return home   -DJ     Criteria for Skilled Interventions Met (PT Clinical Impression)  yes;treatment indicated  -DJ     Rehab Potential (PT Clinical Summary)  good, to achieve stated  therapy goals  -DJ     Row Name 03/04/20 1559          Vital Signs    O2 Delivery Pre Treatment  supplemental O2 2L  -DJ     O2 Delivery Intra Treatment  supplemental O2 2L  -DJ     O2 Delivery Post Treatment  supplemental O2 2L  -DJ     Pre Patient Position  Sitting  -DJ     Intra Patient Position  Standing  -DJ     Post Patient Position  Supine  -DJ     Row Name 03/04/20 1559          Positioning and Restraints    Pre-Treatment Position  sitting in chair/recliner  -DJ     Post Treatment Position  bed  -DJ     In Bed  supine;call light within reach;encouraged to call for assist;with nsg  -DJ       User Key  (r) = Recorded By, (t) = Taken By, (c) = Cosigned By    Initials Name Provider Type    Nicole Hurtado, PT Physical Therapist        Outcome Measures     Row Name 03/04/20 1607          How much help from another person do you currently need...    Turning from your back to your side while in flat bed without using bedrails?  3  -DJ     Moving from lying on back to sitting on the side of a flat bed without bedrails?  3  -DJ     Moving to and from a bed to a chair (including a wheelchair)?  3  -DJ     Standing up from a chair using your arms (e.g., wheelchair, bedside chair)?  3  -DJ     Climbing 3-5 steps with a railing?  2  -DJ     To walk in hospital room?  3  -DJ     AM-PAC 6 Clicks Score (PT)  17  -DJ     Row Name 03/04/20 1607          Functional Assessment    Outcome Measure Options  AM-PAC 6 Clicks Basic Mobility (PT)  -DJ       User Key  (r) = Recorded By, (t) = Taken By, (c) = Cosigned By    Initials Name Provider Type    Nicole Hurtado, PT Physical Therapist          PT Recommendation and Plan  Planned Therapy Interventions (PT Eval): balance training, bed mobility training, gait training, home exercise program, strengthening, stair training, transfer training  Outcome Summary/Treatment Plan (PT)  Anticipated Equipment Needs at Discharge (PT): front wheeled walker  Anticipated Discharge Disposition  (PT): home with home health, inpatient rehabilitation facility(may benefit from short rehab stay )  Plan of Care Reviewed With: patient  Outcome Summary: 84 yo frail white female admitted 3/3/20 with acute exaccerbation COPD and SOA. PMH includes AAA, near syncope, htn, afib, and CKD. Today she is limited by generalized weakness, decreased endurance, and decreased balance - all affecting her functional mobility. She would like to return to her home but she has a lot of stairs and fears that she will have to go to a nursing home. She would certainly benefit from skilled PT for ther ex, gt/transfer training, bed mobility, balance, and endurance/activity tolerance as appropriate. She used a cane or 0 AD previously but I feel she would be safer using a r wx, quinn considering her fatigue and decreased endurance.     Time Calculation:   PT Charges     Row Name 03/04/20 1609             Time Calculation    Start Time  1530  -DJ      Stop Time  1550  -DJ      Time Calculation (min)  20 min  -DJ      PT Non-Billable Time (min)  10 min  -DJ      PT Received On  03/04/20  -DJ      PT - Next Appointment  03/05/20  -DJ      PT Goal Re-Cert Due Date  03/11/20  -DJ        User Key  (r) = Recorded By, (t) = Taken By, (c) = Cosigned By    Initials Name Provider Type    Nicole Hurtado PT Physical Therapist        Therapy Charges for Today     Code Description Service Date Service Provider Modifiers Qty    69718326916 HC PT EVAL LOW COMPLEXITY 2 3/4/2020 Nicole Marquez, PT GP 1    06701141207 HC PT THER PROC EA 15 MIN 3/4/2020 Nicole Marquez, PT GP 1          PT G-Codes  Outcome Measure Options: AM-PAC 6 Clicks Basic Mobility (PT)  AM-PAC 6 Clicks Score (PT): 17    Nicole Marquez PT  3/4/2020

## 2020-03-04 NOTE — PLAN OF CARE
Problem: Patient Care Overview  Goal: Plan of Care Review  Outcome: Ongoing (interventions implemented as appropriate)  Flowsheets  Taken 3/4/2020 1608  Plan of Care Reviewed With: patient  Taken 3/4/2020 5360  Outcome Summary: 86 yo frail white female admitted 3/3/20 with acute exaccerbation COPD and SOA. PMH includes AAA, near syncope, htn, afib, and CKD. Today she is limited by generalized weakness, decreased endurance, and decreased balance - all affecting her functional mobility. She would like to return to her home but she has a lot of stairs and fears that she will have to go to a nursing home. She would certainly benefit from skilled PT for ther ex, gt/transfer training, bed mobility, balance, and endurance/activity tolerance as appropriate. She used a cane or no AD previously, but I feel she would be safer using a r wx, quinn considering her fatigue and decreased endurance.

## 2020-03-04 NOTE — PROGRESS NOTES
" LOS: 1 day     Name: Lucy Duane  Age: 85 y.o.  Sex: female  :  1934  MRN: [id number]         Primary Care Physician: Migdalia Espitia MD    Subjective   Subjective  States that she feels significantly better than she did yesterday.  Shortness of breath significantly improved.  No new complaints.  Feels as if her ankles were swollen yesterday but no longer.    Objective   Vital Signs  Temp:  [97.6 °F (36.4 °C)-98.3 °F (36.8 °C)] 98.3 °F (36.8 °C)  Heart Rate:  [] 91  Resp:  [12-20] 18  BP: (105-157)/(64-92) 119/92  Body mass index is 19.94 kg/m².    Objective:  General Appearance:  Comfortable and in no acute distress.    Vital signs: (most recent): Blood pressure 119/92, pulse 91, temperature 98.3 °F (36.8 °C), temperature source Oral, resp. rate 18, height 175.3 cm (69\"), weight 61.2 kg (135 lb), SpO2 97 %, not currently breastfeeding.    Lungs:  Normal effort and normal respiratory rate.  There are decreased breath sounds and wheezes.    Heart: Normal rate.  Regular rhythm.    Abdomen: Abdomen is soft.  Bowel sounds are normal.   There is no abdominal tenderness.     Extremities: There is no dependent edema or local swelling.    Neurological: Patient is alert and oriented to person, place and time.    Skin:  Warm and dry.              Results Review:       I reviewed the patient's new clinical results.    Results from last 7 days   Lab Units 20  0631 20  1140   WBC 10*3/mm3 4.82 8.18   HEMOGLOBIN g/dL 9.8* 10.6*   PLATELETS 10*3/mm3 150 221     Results from last 7 days   Lab Units 20  0631 20  1140   SODIUM mmol/L 141 141   POTASSIUM mmol/L 3.7 4.3   CHLORIDE mmol/L 91* 91*   CO2 mmol/L 36.9* 40.1*   BUN mg/dL 29* 29*   CREATININE mg/dL 1.32* 1.46*   CALCIUM mg/dL 9.1 8.9   GLUCOSE mg/dL 119* 184*     Results from last 7 days   Lab Units 20  1140   INR  1.02             Scheduled Meds:     carvedilol 12.5 mg Oral BID With Meals   dilTIAZem 60 mg Oral Q8H   enoxaparin 30 " mg Subcutaneous Q24H   furosemide 40 mg Oral Daily   ipratropium-albuterol 3 mL Nebulization 4x Daily - RT   predniSONE 40 mg Oral Daily With Breakfast   sodium chloride 500 mL Intravenous Once   sodium chloride 10 mL Intravenous Q12H     PRN Meds:   •  acetaminophen **OR** acetaminophen **OR** acetaminophen  •  ALPRAZolam  •  nitroglycerin  •  ondansetron  •  sodium chloride  Continuous Infusions:       Assessment/Plan   Active Hospital Problems    Diagnosis  POA   • **Acute exacerbation of chronic obstructive pulmonary disease (COPD) (CMS/[facility]) [J44.1]  Yes   • Oxygen dependent [Z99.81]  Not Applicable   • CKD (chronic kidney disease) [N18.9]  Yes   • Acute on chronic diastolic CHF (congestive heart failure) (CMS/[facility]) [I50.33]  Yes   • Hypoxia [R09.02]  Yes   • Atrial fibrillation (CMS/[facility]) [I48.91]  Yes   • HTN (hypertension) [I10]  Yes      Resolved Hospital Problems   No resolved problems to display.       Assessment & Plan    -Atrial fibrillation with RVR under much better control and heart rate back in the 80s.  Review of records from cardiology on prior admissions indicates that she has had PAF since 2016 but deemed not a candidate for anticoagulation due to GI bleeding.  Continue beta-blocker and calcium channel blocker.  -Today she looks euvolemic and will transition her back to oral Lasix.  -Oxygen now back at her baseline requirement of 2 L.  She has some wheezing on exam today.  Continue bronchodilators, pulmonary hygiene, and prednisone.  Appreciate assistance from pulmonology.  -We will ambulate her with physical therapy to help determine disposition.  -If continues to show improvement she might be ready for discharge in the next 1 to 2 days.    Dispo    Noble Calle MD  [facility]  03/04/20  12:37 PM

## 2020-03-04 NOTE — PROGRESS NOTES
[facility] INPATIENT PROGRESS NOTE         [address]    3/4/2020      PATIENT IDENTIFICATION:  Name: Lucy Duane ADMIT: 3/3/2020   : 1934  PCP: Migdalia Espitia MD    MRN: [id number] LOS: 1 days   AGE/SEX: 85 y.o. female  ROOM: [facility]                     LOS 1    Reason for visit: COPD and hypercapnia      SUBJECTIVE:      Says that she is breathing a bit better this morning.  No chest pain or productive cough.  Pleasantly confused but easily redirected.  diminished on auscultation but no active wheezing.    Objective   OBJECTIVE:    Vital Sign Min/Max for last 24 hours  Temp  Min: 96.8 °F (36 °C)  Max: 98.3 °F (36.8 °C)   BP  Min: 70/52  Max: 157/84   Pulse  Min: 79  Max: 125   Resp  Min: 12  Max: 20   SpO2  Min: 89 %  Max: 100 %   No data recorded   Weight  Min: 61.2 kg (135 lb)  Max: 61.2 kg (135 lb)                         Body mass index is 19.94 kg/m².    Intake/Output Summary (Last 24 hours) at 3/4/2020 0916  Last data filed at 3/4/2020 0600  Gross per 24 hour   Intake --   Output 1150 ml   Net -1150 ml         Exam:  GEN:  No distress, appears stated age  EYES:   PERRL, anicteric sclera  ENT:    External ears/nose normal, OP clear  NECK:  No adenopathy, midline trachea  LUNGS: Normal chest on inspection, palpation and diminished on auscultation  CV:  Normal S1S2, without murmur  ABD:  Non tender, non distended, no hepatosplenomegaly, +BS  EXT:  No edema, cyanosis or clubbing    Scheduled meds:    carvedilol 12.5 mg Oral BID With Meals   dilTIAZem 60 mg Oral Q8H   enoxaparin 30 mg Subcutaneous Q24H   furosemide 40 mg Intravenous BID   ipratropium-albuterol 3 mL Nebulization 4x Daily - RT   methylPREDNISolone sodium succinate 40 mg Intravenous Q8H   sodium chloride 500 mL Intravenous Once   sodium chloride 10 mL Intravenous Q12H     IV meds:                         Data Review:  Results from last 7 days   Lab Units 20  0631 20  1140   SODIUM mmol/L 141 141   POTASSIUM  mmol/L 3.7 4.3   CHLORIDE mmol/L 91* 91*   CO2 mmol/L 36.9* 40.1*   BUN mg/dL 29* 29*   CREATININE mg/dL 1.32* 1.46*   GLUCOSE mg/dL 119* 184*   CALCIUM mg/dL 9.1 8.9         Estimated Creatinine Clearance: 30.1 mL/min (A) (by C-G formula based on SCr of 1.32 mg/dL (H)).  Results from last 7 days   Lab Units 03/04/20  0631 03/03/20  1140   WBC 10*3/mm3 4.82 8.18   HEMOGLOBIN g/dL 9.8* 10.6*   PLATELETS 10*3/mm3 150 221     Results from last 7 days   Lab Units 03/03/20  1140   INR  1.02     Results from last 7 days   Lab Units 03/04/20  0631 03/03/20  1140   ALT (SGPT) U/L 5 7   AST (SGOT) U/L 14 13                 X-ray 3/3/2020 reviewed    )Assessment   ASSESSMENT:      Active Hospital Problems    Diagnosis  POA   • **Acute exacerbation of chronic obstructive pulmonary disease (COPD) (CMS/[facility]) [J44.1]  Yes   • Oxygen dependent [Z99.81]  Not Applicable   • CKD (chronic kidney disease) [N18.9]  Yes   • Acute on chronic diastolic CHF (congestive heart failure) (CMS/[facility]) [I50.33]  Yes   • Hypoxia [R09.02]  Yes   • Atrial fibrillation (CMS/[facility]) [I48.91]  Yes   • HTN (hypertension) [I10]  Yes      Resolved Hospital Problems   No resolved problems to display.     Chronic hypoxemic respiratory failure and hypercapnic respiratory failure  COPD with acute exacerbation  Small left pleural effusion  Acute on chronic diastolic CHF  A. fib with RVR  Chronic kidney disease      PLAN:  Bronchodilators for COPD.  Weaning steroids.  Change Solu-Medrol to p.o. Prednisone.    I am seeing the patient for the first time today.  All patient problems are new to me.      William Bobby MD  Pulmonary and Critical Care Medicine  [facility], [facility]  3/4/2020    9:16 AM

## 2020-03-04 NOTE — DISCHARGE PLACEMENT REQUEST
"Duane, Lucy (85 y.o. Female)     Date of Birth Social Security Number Address Home Phone MRN    1934  [id number] [id number] [phone] [id number]    [employer] Mar[employer] Status          Mormon Unknown       Admission Date Admission Type Admitting Provider Attending Provider Department, Room/Bed    3/3/20 Emergency Bernadine Ireland MD [place], Robert Russell, MD [address], E452/1    Discharge Date Discharge Disposition Discharge Destination                       Attending Provider:  Noble Calle MD    Allergies:  No Known Allergies    Isolation:  None   Infection:  None   Code Status:  CPR    Ht:  175.3 cm (69\")   Wt:  61.2 kg (135 lb)    Admission Cmt:  None   Principal Problem:  Acute exacerbation of chronic obstructive pulmonary disease (COPD) (CMS/[facility]) [J44.1]                 Active Insurance as of 3/3/2020     Primary Coverage     Payor Plan Insurance Group Employer/Plan Group    MEDICARE MEDICARE A & B      Payor Plan Address Payor Plan Phone Number Payor Plan Fax Number Effective Dates    [address] [phone]  8/1/1999 - None Entered    [facility] [id number]       Subscriber Name Subscriber Birth Date Member ID       DUANE,LUCY 1934 [id number]           Secondary Coverage     Payor Plan Insurance Group Employer/Plan Group    [facility] SUPP [id number]     Payor Plan Address Payor Plan Phone Number Payor Plan Fax Number Effective Dates    [address]   7/1/2003 - None Entered    [facility] [id number]       Subscriber Name Subscriber Birth Date Member ID       DUANE,LUCY 1934 [id number]                 Emergency Contacts      (Rel.) Home Phone Work Phone Mobile Phone    Duane,Dan (Son) -- [phone]              "

## 2020-03-05 LAB
ANION GAP SERPL CALCULATED.3IONS-SCNC: 11 MMOL/L (ref 5–15)
BUN BLD-MCNC: 44 MG/DL (ref 8–23)
BUN/CREAT SERPL: 26.7 (ref 7–25)
CALCIUM SPEC-SCNC: 8.8 MG/DL (ref 8.6–10.5)
CHLORIDE SERPL-SCNC: 88 MMOL/L (ref 98–107)
CO2 SERPL-SCNC: 37 MMOL/L (ref 22–29)
CREAT BLD-MCNC: 1.65 MG/DL (ref 0.57–1)
DEPRECATED RDW RBC AUTO: 41.7 FL (ref 37–54)
ERYTHROCYTE [DISTWIDTH] IN BLOOD BY AUTOMATED COUNT: 12.4 % (ref 12.3–15.4)
GFR SERPL CREATININE-BSD FRML MDRD: 30 ML/MIN/1.73
GLUCOSE BLD-MCNC: 120 MG/DL (ref 65–99)
HCT VFR BLD AUTO: 28.8 % (ref 34–46.6)
HGB BLD-MCNC: 9.6 G/DL (ref 12–15.9)
MCH RBC QN AUTO: 30.9 PG (ref 26.6–33)
MCHC RBC AUTO-ENTMCNC: 33.3 G/DL (ref 31.5–35.7)
MCV RBC AUTO: 92.6 FL (ref 79–97)
PLATELET # BLD AUTO: 182 10*3/MM3 (ref 140–450)
PMV BLD AUTO: 10.6 FL (ref 6–12)
POTASSIUM BLD-SCNC: 3.6 MMOL/L (ref 3.5–5.2)
RBC # BLD AUTO: 3.11 10*6/MM3 (ref 3.77–5.28)
SODIUM BLD-SCNC: 136 MMOL/L (ref 136–145)
WBC NRBC COR # BLD: 10.53 10*3/MM3 (ref 3.4–10.8)

## 2020-03-05 PROCEDURE — 94799 UNLISTED PULMONARY SVC/PX: CPT

## 2020-03-05 PROCEDURE — 80048 BASIC METABOLIC PNL TOTAL CA: CPT | Performed by: INTERNAL MEDICINE

## 2020-03-05 PROCEDURE — 97110 THERAPEUTIC EXERCISES: CPT

## 2020-03-05 PROCEDURE — 85027 COMPLETE CBC AUTOMATED: CPT | Performed by: INTERNAL MEDICINE

## 2020-03-05 RX ADMIN — DILTIAZEM HYDROCHLORIDE 60 MG: 60 TABLET, FILM COATED ORAL at 06:27

## 2020-03-05 RX ADMIN — IPRATROPIUM BROMIDE AND ALBUTEROL SULFATE 3 ML: 2.5; .5 SOLUTION RESPIRATORY (INHALATION) at 12:44

## 2020-03-05 RX ADMIN — SODIUM CHLORIDE, PRESERVATIVE FREE 10 ML: 5 INJECTION INTRAVENOUS at 08:27

## 2020-03-05 RX ADMIN — FUROSEMIDE 40 MG: 40 TABLET ORAL at 08:27

## 2020-03-05 RX ADMIN — ALPRAZOLAM 0.5 MG: 0.5 TABLET ORAL at 21:10

## 2020-03-05 RX ADMIN — IPRATROPIUM BROMIDE AND ALBUTEROL SULFATE 3 ML: 2.5; .5 SOLUTION RESPIRATORY (INHALATION) at 08:03

## 2020-03-05 RX ADMIN — CARVEDILOL 12.5 MG: 12.5 TABLET, FILM COATED ORAL at 08:27

## 2020-03-05 RX ADMIN — DILTIAZEM HYDROCHLORIDE 60 MG: 60 TABLET, FILM COATED ORAL at 13:18

## 2020-03-05 RX ADMIN — ALPRAZOLAM 0.5 MG: 0.5 TABLET ORAL at 13:18

## 2020-03-05 RX ADMIN — SODIUM CHLORIDE, PRESERVATIVE FREE 10 ML: 5 INJECTION INTRAVENOUS at 21:00

## 2020-03-05 RX ADMIN — DILTIAZEM HYDROCHLORIDE 60 MG: 60 TABLET, FILM COATED ORAL at 21:10

## 2020-03-05 RX ADMIN — CARVEDILOL 12.5 MG: 12.5 TABLET, FILM COATED ORAL at 18:35

## 2020-03-05 RX ADMIN — IPRATROPIUM BROMIDE AND ALBUTEROL SULFATE 3 ML: 2.5; .5 SOLUTION RESPIRATORY (INHALATION) at 20:01

## 2020-03-05 NOTE — PROGRESS NOTES
[facility] INPATIENT PROGRESS NOTE         [address]    3/5/2020      PATIENT IDENTIFICATION:  Name: Lucy Duane ADMIT: 3/3/2020   : 1934  PCP: Migdalia Espitia MD    MRN: [id number] LOS: 2 days   AGE/SEX: 85 y.o. female  ROOM: [facility]                     LOS 2    Reason for visit: COPD and hypercapnia      SUBJECTIVE:      Resting comfortably.  Slept well last night.  Denies chest pain, nausea or vomiting or productive cough.  Diminished breath sounds but no wheezing or rhonchi.  Irregularly irregular.  Switched to oral steroids.    Objective   OBJECTIVE:    Vital Sign Min/Max for last 24 hours  Temp  Min: 97.4 °F (36.3 °C)  Max: 98.1 °F (36.7 °C)   BP  Min: 106/50  Max: 114/55   Pulse  Min: 80  Max: 98   Resp  Min: 16  Max: 18   SpO2  Min: 95 %  Max: 98 %   No data recorded   No data recorded                         Body mass index is 19.94 kg/m².    Intake/Output Summary (Last 24 hours) at 3/5/2020 0810  Last data filed at 3/5/2020 0700  Gross per 24 hour   Intake 210 ml   Output 150 ml   Net 60 ml         Exam:  GEN:  No distress, appears stated age  EYES:   PERRL, anicteric sclera  ENT:    External ears/nose normal, OP clear  NECK:  No adenopathy, midline trachea  LUNGS: Normal chest on inspection, palpation and diminished on auscultation  CV:  Irregularly irregular, without murmur  ABD:  Non tender, non distended, no hepatosplenomegaly, +BS  EXT:  No edema, cyanosis or clubbing    Scheduled meds:      carvedilol 12.5 mg Oral BID With Meals   dilTIAZem 60 mg Oral Q8H   enoxaparin 30 mg Subcutaneous Q24H   furosemide 40 mg Oral Daily   ipratropium-albuterol 3 mL Nebulization 4x Daily - RT   predniSONE 40 mg Oral Daily With Breakfast   sodium chloride 500 mL Intravenous Once   sodium chloride 10 mL Intravenous Q12H     IV meds:                         Data Review:  Results from last 7 days   Lab Units 20  0340 20  0631 20  1140   SODIUM mmol/L 136 141 141   POTASSIUM  mmol/L 3.6 3.7 4.3   CHLORIDE mmol/L 88* 91* 91*   CO2 mmol/L 37.0* 36.9* 40.1*   BUN mg/dL 44* 29* 29*   CREATININE mg/dL 1.65* 1.32* 1.46*   GLUCOSE mg/dL 120* 119* 184*   CALCIUM mg/dL 8.8 9.1 8.9         Estimated Creatinine Clearance: 24.1 mL/min (A) (by C-G formula based on SCr of 1.65 mg/dL (H)).  Results from last 7 days   Lab Units 03/05/20  0340 03/04/20  0631 03/03/20  1140   WBC 10*3/mm3 10.53 4.82 8.18   HEMOGLOBIN g/dL 9.6* 9.8* 10.6*   PLATELETS 10*3/mm3 182 150 221     Results from last 7 days   Lab Units 03/03/20  1140   INR  1.02     Results from last 7 days   Lab Units 03/04/20  0631 03/03/20  1140   ALT (SGPT) U/L 5 7   AST (SGOT) U/L 14 13                 X-ray 3/3/2020 reviewed    )Assessment   ASSESSMENT:      Active Hospital Problems    Diagnosis  POA   • **Acute exacerbation of chronic obstructive pulmonary disease (COPD) (CMS/[facility]) [J44.1]  Yes   • Oxygen dependent [Z99.81]  Not Applicable   • CKD (chronic kidney disease) [N18.9]  Yes   • Acute on chronic diastolic CHF (congestive heart failure) (CMS/[facility]) [I50.33]  Yes   • Hypoxia [R09.02]  Yes   • Atrial fibrillation (CMS/[facility]) [I48.91]  Yes   • HTN (hypertension) [I10]  Yes      Resolved Hospital Problems   No resolved problems to display.     Chronic hypoxemic respiratory failure and hypercapnic respiratory failure  COPD with acute exacerbation  Small left pleural effusion  Acute on chronic diastolic CHF  A. fib with RVR  Chronic kidney disease      PLAN:  Bronchodilators for COPD.  Weaning steroids.  Changed Solu-Medrol to p.o. Prednisone.  Making progress.  Likely home soon.  Okay from my standpoint.           William Bobby MD  Pulmonary and Critical Care Medicine  [facility], [facility]  3/5/2020    8:10 AM

## 2020-03-05 NOTE — PLAN OF CARE
Problem: Patient Care Overview  Goal: Plan of Care Review  Flowsheets (Taken 3/5/2020 1133)  Plan of Care Reviewed With: patient  Outcome Summary: Pt. able to ambulate 120 feet, CGA x1, with use of Rwx this date.  Pt. requires CGA x 1 for sit <-> stand transfers.  Verbal/tactile cues for posture correction during ambulation. Pt. limited in gait distance due to overall fatigue and SOA.

## 2020-03-05 NOTE — PROGRESS NOTES
" LOS: 2 days     Name: Lucy Duane  Age: 85 y.o.  Sex: female  :  1934  MRN: [id number]         Primary Care Physician: Migdalia Espitia MD    Subjective   Subjective  She is refusing steroids.  Did not want to talk with me about potential benefits of using them.  Simply states \"I will not take them\".  Still with a little bit of shortness of breath.  Denies chest pain.  Up ambulating in the room with physical therapy upon my visit.    Objective   Vital Signs  Temp:  [97.4 °F (36.3 °C)-98.1 °F (36.7 °C)] 97.4 °F (36.3 °C)  Heart Rate:  [80-98] 93  Resp:  [16-18] 18  BP: (106-114)/(50-91) 106/50  Body mass index is 19.35 kg/m².    Objective:  General Appearance:  Comfortable and in no acute distress (Elderly and frail-appearing).    Vital signs: (most recent): Blood pressure 106/50, pulse 93, temperature 97.4 °F (36.3 °C), temperature source Oral, resp. rate 18, height 175.3 cm (69\"), weight 59.4 kg (131 lb), SpO2 98 %, not currently breastfeeding.    Lungs:  Normal effort and normal respiratory rate.  There are decreased breath sounds.  (Diminished breath sounds but no crackles, rhonchi, wheezing.  No respiratory distress or increased work of breathing.)  Heart: Normal rate.  Regular rhythm.    Abdomen: Abdomen is soft.  Bowel sounds are normal.   There is no abdominal tenderness.     Extremities: There is no dependent edema or local swelling.    Neurological: Patient is alert and oriented to person, place and time.    Skin:  Warm and dry.              Results Review:       I reviewed the patient's new clinical results.    Results from last 7 days   Lab Units 20  0340 20  0631 20  1140   WBC 10*3/mm3 10.53 4.82 8.18   HEMOGLOBIN g/dL 9.6* 9.8* 10.6*   PLATELETS 10*3/mm3 182 150 221     Results from last 7 days   Lab Units 20  0340 20  0631 20  1140   SODIUM mmol/L 136 141 141   POTASSIUM mmol/L 3.6 3.7 4.3   CHLORIDE mmol/L 88* 91* 91*   CO2 mmol/L 37.0* 36.9* 40.1*   BUN " mg/dL 44* 29* 29*   CREATININE mg/dL 1.65* 1.32* 1.46*   CALCIUM mg/dL 8.8 9.1 8.9   GLUCOSE mg/dL 120* 119* 184*     Results from last 7 days   Lab Units 03/03/20  1140   INR  1.02             Scheduled Meds:     carvedilol 12.5 mg Oral BID With Meals   dilTIAZem 60 mg Oral Q8H   enoxaparin 30 mg Subcutaneous Q24H   furosemide 40 mg Oral Daily   ipratropium-albuterol 3 mL Nebulization 4x Daily - RT   sodium chloride 500 mL Intravenous Once   sodium chloride 10 mL Intravenous Q12H     PRN Meds:   •  acetaminophen **OR** acetaminophen **OR** acetaminophen  •  ALPRAZolam  •  nitroglycerin  •  ondansetron  •  sodium chloride  Continuous Infusions:       Assessment/Plan   Active Hospital Problems    Diagnosis  POA   • **Acute exacerbation of chronic obstructive pulmonary disease (COPD) (CMS/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) [I50.33]  Yes   • Hypoxia [R09.02]  Yes   • Atrial fibrillation (CMS/[facility]) [I48.91]  Yes   • HTN (hypertension) [I10]  Yes      Resolved Hospital Problems   No resolved problems to display.       Assessment & Plan    -Atrial fibrillation with RVR under much better control and heart rate back in the 80s.  Review of records from cardiology on prior admissions indicates that she has had PAF since 2016 but deemed not a candidate for anticoagulation due to GI bleeding.  Continue beta-blocker and calcium channel blocker.  -Transitioned back to oral Lasix.  -Oxygen now back at her baseline requirement of 2 L.  Continue bronchodilators and pulmonary hygiene.  The patient refuses steroids and thus I have discontinued these.  -Working with physical therapy    Dispo  She will have a bed available at rehab tomorrow and will plan to discharge her then if she remains stable.    Noble Calle MD  [facility]  03/05/20  12:04 PM

## 2020-03-05 NOTE — PLAN OF CARE
Problem: Patient Care Overview  Goal: Plan of Care Review  Outcome: Ongoing (interventions implemented as appropriate)  Flowsheets (Taken 3/5/2020 4950)  Progress: improving  Plan of Care Reviewed With: patient; son  Note:   VSS, o2 decreased to 3lpm, tolerating well. No complaints of pain, tolerating fluids well, assisted to BR. Safety maintained. Will continue to monitor.

## 2020-03-05 NOTE — THERAPY TREATMENT NOTE
Patient Name: Lucy Duane  : 1934    MRN: [id number]                              Today's Date: 3/5/2020       Admit Date: 3/3/2020    Visit Dx:     ICD-10-CM ICD-9-CM   1. Acute exacerbation of chronic obstructive pulmonary disease (COPD) (CMS/[facility]) J44.1 491.21   2. Acute on chronic congestive heart failure, unspecified heart failure type (CMS/[facility]) I50.9 428.0   3. Chronic kidney disease, unspecified CKD stage N18.9 585.9   4. Chronic anemia D64.9 285.9     Patient Active Problem List   Diagnosis   • Edema   • HTN (hypertension)   • Agoraphobia   • Orthopnea   • Hyponatremia   • Atrial fibrillation (CMS/[facility])   • Cellulitis of foot   • Proteinuria   • Hypoxia   • Pulmonary nodules   • Pancreatic cyst   • CHF (congestive heart failure) (CMS/[facility])   • Acute on chronic diastolic CHF (congestive heart failure) (CMS/[facility])   • COPD (chronic obstructive pulmonary disease) (CMS/[facility])   • CKD (chronic kidney disease)   • Aortic aneurysm, abdominal (CMS/[facility])   • Acute exacerbation of chronic obstructive pulmonary disease (COPD) (CMS/[facility])   • Oxygen dependent     Past Medical History:   Diagnosis Date   • Atrial fibrillation (CMS/[facility])    • CHF (congestive heart failure) (CMS/[facility])    • Compromised renal function    • COPD (chronic obstructive pulmonary disease) (CMS/[facility])    • Oxygen dependent     2L at all times     Past Surgical History:   Procedure Laterality Date   • BREAST SURGERY      Lumpectomy and abscess removal   • EYE SURGERY       General Information     Row Name 20 1132          PT Evaluation Time/Intention    Document Type  therapy note (daily note) Pt. reports feeling SOA throughout upright mobility but otherwise agreeable to work with P.T.   -MS     Mode of Treatment  physical therapy;individual therapy  -MS     Row Name 20 1136          General Information    Patient Profile Reviewed?  yes  -MS     Existing Precautions/Restrictions  (S) fall;oxygen therapy device and L/min Hard of hearing  -MS      Row Name 03/05/20 1132          Cognitive Assessment/Intervention- PT/OT    Orientation Status (Cognition)  oriented x 3  -MS     Row Name 03/05/20 1132          Safety Issues, Functional Mobility    Comment, Safety Issues/Impairments (Mobility)  Gait belt used for safety.   -MS       User Key  (r) = Recorded By, (t) = Taken By, (c) = Cosigned By    Initials Name Provider Type    Kuldeep Cruz DIYA, PT Physical Therapist        Mobility     Row Name 03/05/20 1134          Bed Mobility Assessment/Treatment    Bed Mobility Assessment/Treatment  supine-sit;sit-supine  -MS     Comment (Bed Mobility)  Pt. up in chair this AM.   -MS     Row Name 03/05/20 1134          Sit-Stand Transfer    Sit-Stand [place] (Transfers)  contact guard  -MS     Assistive Device (Sit-Stand Transfers)  walker, front-wheeled  -MS     Row Name 03/05/20 1134          Gait/Stairs Assessment/Training    [place] Level (Gait)  contact guard  -MS     Assistive Device (Gait)  walker, front-wheeled  -MS     Distance in Feet (Gait)  120 feet  -MS     Pattern (Gait)  step-through  -MS     Deviations/Abnormal Patterns (Gait)  maria isabel decreased  -MS     Bilateral Gait Deviations  forward flexed posture  -MS     Comment (Gait/Stairs)  Verbal/tactile cues for posture correction. Limited in gait distance due to fatigue and SOA.   -MS       User Key  (r) = Recorded By, (t) = Taken By, (c) = Cosigned By    Initials Name Provider Type    Kuldeep Cruz DIYA, PT Physical Therapist        Obj/Interventions    No documentation.       Goals/Plan    No documentation.       Clinical Impression     Row Name 03/05/20 1135          Pain Scale: Numbers Pre/Post-Treatment    Pain Scale: Numbers, Pretreatment  0/10 - no pain  -MS     Pain Scale: Numbers, Post-Treatment  0/10 - no pain  -MS     Row Name 03/05/20 1135          Positioning and Restraints    Pre-Treatment Position  sitting in chair/recliner  -MS     Post Treatment Position  chair  -MS     In  Chair  notified nsg;reclined;sitting;call light within reach;encouraged to call for assist All lines intact.   -MS       User Key  (r) = Recorded By, (t) = Taken By, (c) = Cosigned By    Initials Name Provider Type    Kuldeep Cruz, PT Physical Therapist        Outcome Measures     Row Name 03/05/20 1136          How much help from another person do you currently need...    Turning from your back to your side while in flat bed without using bedrails?  3  -MS     Moving from lying on back to sitting on the side of a flat bed without bedrails?  3  -MS     Moving to and from a bed to a chair (including a wheelchair)?  3  -MS     Standing up from a chair using your arms (e.g., wheelchair, bedside chair)?  3  -MS     Climbing 3-5 steps with a railing?  3  -MS     To walk in hospital room?  3  -MS     AM-PAC 6 Clicks Score (PT)  18  -MS     Row Name 03/05/20 1136          Functional Assessment    Outcome Measure Options  AM-PAC 6 Clicks Basic Mobility (PT)  -MS       User Key  (r) = Recorded By, (t) = Taken By, (c) = Cosigned By    Initials Name Provider Type    Kuldeep Cruz, PT Physical Therapist          PT Recommendation and Plan     Plan of Care Reviewed With: patient  Outcome Summary: Pt. able to ambulate 120 feet, CGA x1, with use of Rwx this date.  Pt. requires CGA x 1 for sit <-> stand transfers.  Verbal/tactile cues for posture correction during ambulation. Pt. limited in gait distance due to overall fatigue and SOA.     Time Calculation:   PT Charges     Row Name 03/05/20 1138             Time Calculation    Start Time  1047  -MS      Stop Time  1104  -MS      Time Calculation (min)  17 min  -MS      PT Received On  03/05/20  -MS      PT - Next Appointment  03/06/20  -MS         Time Calculation- PT    Total Timed Code Minutes- PT  15 minute(s)  -MS        User Key  (r) = Recorded By, (t) = Taken By, (c) = Cosigned By    Initials Name Provider Type    Kuldeep Cruz, PT Physical Therapist         Therapy Charges for Today     Code Description Service Date Service Provider Modifiers Qty    43601810223 HC PT THER PROC EA 15 MIN 3/5/2020 Kuldeep Chávez, PT GP 1          PT G-Codes  Outcome Measure Options: AM-PAC 6 Clicks Basic Mobility (PT)  AM-PAC 6 Clicks Score (PT): 18    Kuldeep Chávez, PT  3/5/2020

## 2020-03-05 NOTE — PLAN OF CARE
Problem: Patient Care Overview  Goal: Plan of Care Review  Outcome: Ongoing (interventions implemented as appropriate)     PO lasix continued. Standing weights. Possible d/c tomorrow. VSS, will continue to monitor.

## 2020-03-06 VITALS
OXYGEN SATURATION: 95 % | TEMPERATURE: 97.3 F | HEIGHT: 69 IN | SYSTOLIC BLOOD PRESSURE: 114 MMHG | BODY MASS INDEX: 19.49 KG/M2 | DIASTOLIC BLOOD PRESSURE: 64 MMHG | WEIGHT: 131.6 LBS | HEART RATE: 99 BPM | RESPIRATION RATE: 20 BRPM

## 2020-03-06 PROCEDURE — 94799 UNLISTED PULMONARY SVC/PX: CPT

## 2020-03-06 RX ORDER — ALPRAZOLAM 0.5 MG/1
0.5 TABLET ORAL 3 TIMES DAILY PRN
Qty: 9 TABLET | Refills: 0 | Status: SHIPPED | OUTPATIENT
Start: 2020-03-06

## 2020-03-06 RX ORDER — DILTIAZEM HYDROCHLORIDE 60 MG/1
60 TABLET, FILM COATED ORAL EVERY 8 HOURS SCHEDULED
Start: 2020-03-06

## 2020-03-06 RX ADMIN — DILTIAZEM HYDROCHLORIDE 60 MG: 60 TABLET, FILM COATED ORAL at 06:10

## 2020-03-06 RX ADMIN — CARVEDILOL 12.5 MG: 12.5 TABLET, FILM COATED ORAL at 08:12

## 2020-03-06 RX ADMIN — SODIUM CHLORIDE, PRESERVATIVE FREE 10 ML: 5 INJECTION INTRAVENOUS at 08:12

## 2020-03-06 RX ADMIN — FUROSEMIDE 40 MG: 40 TABLET ORAL at 08:12

## 2020-03-06 RX ADMIN — ALPRAZOLAM 0.5 MG: 0.5 TABLET ORAL at 13:02

## 2020-03-06 RX ADMIN — IPRATROPIUM BROMIDE AND ALBUTEROL SULFATE 3 ML: 2.5; .5 SOLUTION RESPIRATORY (INHALATION) at 07:15

## 2020-03-06 NOTE — PROGRESS NOTES
[facility] INPATIENT PROGRESS NOTE         [address]    3/6/2020      PATIENT IDENTIFICATION:  Name: Lucy Duane ADMIT: 3/3/2020   : 1934  PCP: Migdalia Espitia MD    MRN: [id number] LOS: 3 days   AGE/SEX: 85 y.o. female  ROOM: [facility]                     LOS 3    Reason for visit: COPD and hypercapnia      SUBJECTIVE:      No new issues overnight.  Respiratory status stable.  Okay to discharge from my standpoint.    Objective   OBJECTIVE:    Vital Sign Min/Max for last 24 hours  Temp  Min: 97.3 °F (36.3 °C)  Max: 97.8 °F (36.6 °C)   BP  Min: 113/63  Max: 127/76   Pulse  Min: 82  Max: 104   Resp  Min: 16  Max: 20   SpO2  Min: 90 %  Max: 99 %   No data recorded   Weight  Min: 59.4 kg (131 lb)  Max: 59.7 kg (131 lb 9.6 oz)                         Body mass index is 19.43 kg/m².    Intake/Output Summary (Last 24 hours) at 3/6/2020 0921  Last data filed at 3/6/2020 0750  Gross per 24 hour   Intake 420 ml   Output 700 ml   Net -280 ml         Exam:  GEN:  No distress, appears stated age  EYES:   PERRL, anicteric sclera  ENT:    External ears/nose normal, OP clear  NECK:  No adenopathy, midline trachea  LUNGS: Normal chest on inspection, palpation and diminished on auscultation  CV:  Irregularly irregular, without murmur  ABD:  Non tender, non distended, no hepatosplenomegaly, +BS  EXT:  No edema, cyanosis or clubbing    Scheduled meds:      carvedilol 12.5 mg Oral BID With Meals   dilTIAZem 60 mg Oral Q8H   enoxaparin 30 mg Subcutaneous Q24H   furosemide 40 mg Oral Daily   ipratropium-albuterol 3 mL Nebulization 4x Daily - RT   sodium chloride 500 mL Intravenous Once   sodium chloride 10 mL Intravenous Q12H     IV meds:                         Data Review:  Results from last 7 days   Lab Units 20  0340 20  0631 20  1140   SODIUM mmol/L 136 141 141   POTASSIUM mmol/L 3.6 3.7 4.3   CHLORIDE mmol/L 88* 91* 91*   CO2 mmol/L 37.0* 36.9* 40.1*   BUN mg/dL 44* 29* 29*   CREATININE  mg/dL 1.65* 1.32* 1.46*   GLUCOSE mg/dL 120* 119* 184*   CALCIUM mg/dL 8.8 9.1 8.9         Estimated Creatinine Clearance: 23.5 mL/min (A) (by C-G formula based on SCr of 1.65 mg/dL (H)).  Results from last 7 days   Lab Units 03/05/20  0340 03/04/20  0631 03/03/20  1140   WBC 10*3/mm3 10.53 4.82 8.18   HEMOGLOBIN g/dL 9.6* 9.8* 10.6*   PLATELETS 10*3/mm3 182 150 221     Results from last 7 days   Lab Units 03/03/20  1140   INR  1.02     Results from last 7 days   Lab Units 03/04/20  0631 03/03/20  1140   ALT (SGPT) U/L 5 7   AST (SGOT) U/L 14 13                 X-ray 3/3/2020 reviewed    )Assessment   ASSESSMENT:      Active Hospital Problems    Diagnosis  POA   • **Acute exacerbation of chronic obstructive pulmonary disease (COPD) (CMS/[facility]) [J44.1]  Yes   • Oxygen dependent [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     Chronic hypoxemic respiratory failure and hypercapnic respiratory failure  COPD with acute exacerbation  Small left pleural effusion  Acute on chronic diastolic CHF  A. fib with RVR  Chronic kidney disease      PLAN:  Bronchodilators for COPD.  Weaning steroids.  Changed Solu-Medrol to p.o. Prednisone.  Making progress.  Likely home soon.  Okay from my standpoint.           William Bobby MD  Pulmonary and Critical Care Medicine  [facility], [facility]  3/6/2020    9:21 AM

## 2020-03-06 NOTE — DISCHARGE SUMMARY
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       DETAILS OF HOSPITAL STAY     Pertinent Test Results and Procedures Performed  Chest x-ray:  There is cardiomegaly with considerable vascular congestion  and some interstitial prominence occurring since study of 01/26/2020 and  most consistent with changes of mild to moderate congestive heart  failure. There is an associated small left pleural effusion. The patient  has underlying COPD with hyperinflation that is unchanged.    Respiratory viral panel negative    Hospital Course  This is an 85-year-old female who presented to the emergency room with altered mental status, progressive shortness of breath, and was found to be in atrial fibrillation with rapid ventricular response.  She has a history of COPD, chronic diastolic heart failure, chronic hypoxic respiratory failure, and paroxysmal atrial fibrillation.  Please see H&P for full details of admission.  She was placed on IV steroids, bronchodilators, given IV Lasix and with this regimen her symptoms improved.  She was followed by pulmonology.  Her atrial fibrillation was brought under better control with no additional RVR.  Prior cardiology notes indicate that she has had PAF since 2016 and is not a candidate for anticoagulation given history of GI bleeding.  Heart rate is now controlled.  She overall feels better.  She has no confusion at this  time.  Pulmonology has cleared her for discharge.  Of note, the patient ultimately refused any additional steroids in the form of prednisone.  She has been transitioned back to oral diuretics at this point will go to subacute rehab prior to transitioning back home.  She is now back to her baseline oxygen requirement of 2 L.    Physical Exam at Discharge:  General: No acute distress, AAOx3  HEENT: EOMI, PERRL  Cardiovascular: +s1 and s2, RRR  Lungs: No rhonchi or wheezing  Abdomen: soft, nontender    Consults:   Consults     Date and Time Order Name Status Description    3/3/2020 6331 Inpatient Pulmonology Consult Completed     3/3/2020 1417 [facility] (on-call MD unless specified) Details Completed             Condition on Discharge: Stable, improved    Discharge Disposition  Skilled Nursing Facility ([place] - External)    Discharge Medications     Discharge Medications      New Medications      Instructions Start Date   dilTIAZem 60 MG tablet  Commonly known as:  CARDIZEM   60 mg, Oral, Every 8 Hours Scheduled         Continue These Medications      Instructions Start Date   ALPRAZolam 0.5 MG tablet  Commonly known as:  XANAX   0.5 mg, Oral, 3 Times Daily PRN      carvedilol 12.5 MG tablet  Commonly known as:  COREG   12.5 mg, Oral, 2 Times Daily With Meals      enoxaparin 30 MG/0.3ML solution syringe  Commonly known as:  LOVENOX   30 mg, Subcutaneous, Every 24 Hours      furosemide 40 MG tablet  Commonly known as:  LASIX   40 mg, Oral, Daily      ipratropium-albuterol 0.5-2.5 mg/3 ml nebulizer  Commonly known as:  DUO-NEB   3 mL, Nebulization, 3 Times Daily - RT      potassium chloride 10 MEQ CR tablet  Commonly known as:  K-DUR,KLOR-CON   10 mEq, Oral, Daily         Stop These Medications    potassium chloride 10 MEQ CR tablet  Commonly known as:  K-DUR            Discharge Diet:   Diet Instructions     Diet: Regular, Cardiac, Specialty Diet; Thin Liquids, No Restrictions; Low Sodium      Discharge Diet:    Regular  Cardiac  Specialty Diet       Fluid Consistency:  Thin Liquids, No Restrictions    Specialty Diets:  Low Sodium          Activity at Discharge:   Activity Instructions     Activity as Tolerated            Follow-up Appointments  Future Appointments   Date Time Provider Department Center   3/25/2020  3:45 PM Migdalia Espitia MD [facility]     Additional Instructions for the Follow-ups that You Need to Schedule     Discharge Follow-up with PCP   As directed       Currently Documented PCP:    Migdalia Espitia MD    PCP Phone Number:    [phone]     Follow Up Details:  1 week               I have examined and discussed discharge planning with the patient today.     Noble Calle MD  03/06/20  11:23 AM    Time: Discharge greater than 30 min

## 2020-03-06 NOTE — PLAN OF CARE
Problem: Patient Care Overview  Goal: Plan of Care Review  Outcome: Ongoing (interventions implemented as appropriate)  Flowsheets  Taken 3/5/2020 0419  Progress: improving  Taken 3/6/2020 0000  Plan of Care Reviewed With: patient  Note:   Uneventful shift.

## 2020-03-09 NOTE — PROGRESS NOTES
Case Management Discharge Note      Final Note: [facility] in [place] ([facility]) with family.    Provided Post Acute Provider List?: Yes  Post Acute Provider List: Nursing Home  Provided Post Acute Provider Quality & Resource List?: Yes  Post Acute Provider Quality and Resource List: Nursing Home  Delivered To: Patient  Method of Delivery: In person    Destination - Selection Complete      Service Provider Request Status Selected Services Address Phone Number Fax Number    [facility] [address] [place] [phone]             Final Discharge Disposition Code: 03 - skilled nursing facility (SNF)

## 2020-03-24 ENCOUNTER — TELEPHONE (OUTPATIENT)
Dept: INTERNAL MEDICINE | Facility: CLINIC | Age: 85
End: 2020-03-24

## 2020-03-24 NOTE — TELEPHONE ENCOUNTER
Patient is in [facility] in [place] she will not be able to come back to the office for care, just wanted to let you know how much she appreciated you and the office staff for the sincere care that she received.